# Patient Record
Sex: MALE | URBAN - METROPOLITAN AREA
[De-identification: names, ages, dates, MRNs, and addresses within clinical notes are randomized per-mention and may not be internally consistent; named-entity substitution may affect disease eponyms.]

---

## 2017-06-02 ENCOUNTER — DOCTOR'S OFFICE (OUTPATIENT)
Dept: URBAN - METROPOLITAN AREA CLINIC 136 | Facility: CLINIC | Age: 72
Setting detail: OPHTHALMOLOGY
End: 2017-06-02
Payer: MEDICARE

## 2017-06-02 ENCOUNTER — RX ONLY (RX ONLY)
Age: 72
End: 2017-06-02

## 2017-06-02 DIAGNOSIS — H25.12: ICD-10-CM

## 2017-06-02 DIAGNOSIS — E11.3551: ICD-10-CM

## 2017-06-02 DIAGNOSIS — Z96.1: ICD-10-CM

## 2017-06-02 DIAGNOSIS — H25.22: ICD-10-CM

## 2017-06-02 DIAGNOSIS — H35.379: ICD-10-CM

## 2017-06-02 PROCEDURE — 99205 OFFICE O/P NEW HI 60 MIN: CPT | Performed by: OPHTHALMOLOGY

## 2017-06-02 PROCEDURE — 92134 CPTRZ OPH DX IMG PST SGM RTA: CPT | Performed by: OPHTHALMOLOGY

## 2017-06-02 ASSESSMENT — CORNEAL PTERYGIUM: OS_PTERYGIUM: 1MM

## 2017-06-02 ASSESSMENT — REFRACTION_MANIFEST
OD_VA2: 20/
OU_VA: 20/
OD_VA1: 20/
OD_VA2: 20/
OS_VA3: 20/
OS_VA3: 20/
OS_VA2: 20/
OS_VA2: 20/
OS_VA1: 20/
OS_VA2: 20/
OS_VA1: 20/
OS_VA3: 20/
OD_VA3: 20/
OU_VA: 20/
OD_VA1: 20/
OD_VA3: 20/
OU_VA: 20/
OD_VA3: 20/
OD_VA1: 20/
OS_VA1: 20/
OD_VA2: 20/

## 2017-06-02 ASSESSMENT — SUPERFICIAL PUNCTATE KERATITIS (SPK)
OD_SPK: 2+
OS_SPK: 1+

## 2017-06-02 ASSESSMENT — REFRACTION_CURRENTRX
OS_OVR_VA: 20/
OD_OVR_VA: 20/
OS_OVR_VA: 20/
OS_OVR_VA: 20/
OD_OVR_VA: 20/
OD_OVR_VA: 20/

## 2017-06-02 ASSESSMENT — VISUAL ACUITY
OD_BCVA: 20/HM
OS_BCVA: 20/60

## 2017-07-12 ENCOUNTER — DOCTOR'S OFFICE (OUTPATIENT)
Dept: URBAN - METROPOLITAN AREA CLINIC 136 | Facility: CLINIC | Age: 72
Setting detail: OPHTHALMOLOGY
End: 2017-07-12
Payer: MEDICARE

## 2017-07-12 DIAGNOSIS — H35.379: ICD-10-CM

## 2017-07-12 DIAGNOSIS — M35.01: ICD-10-CM

## 2017-07-12 DIAGNOSIS — H25.22: ICD-10-CM

## 2017-07-12 DIAGNOSIS — E11.3551: ICD-10-CM

## 2017-07-12 DIAGNOSIS — H25.12: ICD-10-CM

## 2017-07-12 DIAGNOSIS — Z96.1: ICD-10-CM

## 2017-07-12 PROCEDURE — 92014 COMPRE OPH EXAM EST PT 1/>: CPT | Performed by: OPHTHALMOLOGY

## 2017-07-12 PROCEDURE — 76512 OPH US DX B-SCAN: CPT | Performed by: OPHTHALMOLOGY

## 2017-07-12 PROCEDURE — 92250 FUNDUS PHOTOGRAPHY W/I&R: CPT | Performed by: OPHTHALMOLOGY

## 2017-07-12 ASSESSMENT — REFRACTION_MANIFEST
OD_VA2: 20/
OD_VA2: 20/
OS_VA1: 20/
OS_VA3: 20/
OD_VA1: 20/
OS_VA1: 20/
OD_VA2: 20/
OS_VA1: 20/
OD_VA3: 20/
OS_VA3: 20/
OD_VA3: 20/
OU_VA: 20/
OS_VA2: 20/
OD_VA1: 20/
OU_VA: 20/
OD_VA3: 20/
OS_VA2: 20/
OS_VA3: 20/
OS_VA2: 20/
OD_VA1: 20/
OU_VA: 20/

## 2017-07-12 ASSESSMENT — CONFRONTATIONAL VISUAL FIELD TEST (CVF): OS_FINDINGS: FULL

## 2017-07-12 ASSESSMENT — REFRACTION_CURRENTRX
OD_OVR_VA: 20/
OS_OVR_VA: 20/
OD_OVR_VA: 20/
OD_OVR_VA: 20/
OS_OVR_VA: 20/
OS_OVR_VA: 20/

## 2017-07-12 ASSESSMENT — KERATOMETRY
OS_K1POWER_DIOPTERS: 44.25
OD_K1POWER_DIOPTERS: 44.75
OD_AXISANGLE_DEGREES: 27
OS_K2POWER_DIOPTERS: 46.50
OS_AXISANGLE_DEGREES: 172
OD_K2POWER_DIOPTERS: 45.50
METHOD_AUTO_MANUAL: AUTO

## 2017-07-12 ASSESSMENT — SPHEQUIV_DERIVED: OD_SPHEQUIV: 0

## 2017-07-12 ASSESSMENT — SUPERFICIAL PUNCTATE KERATITIS (SPK)
OS_SPK: 1+
OD_SPK: 2+

## 2017-07-12 ASSESSMENT — REFRACTION_AUTOREFRACTION
OD_SPHERE: +0.75
OD_AXIS: 110
OD_CYLINDER: -1.50

## 2017-07-12 ASSESSMENT — AXIALLENGTH_DERIVED: OD_AL: 23.0098

## 2017-07-12 ASSESSMENT — VISUAL ACUITY
OD_BCVA: 20/CF @FACE
OS_BCVA: 20/60-2

## 2017-07-12 ASSESSMENT — CORNEAL PTERYGIUM: OS_PTERYGIUM: 1MM

## 2017-07-26 ENCOUNTER — DOCTOR'S OFFICE (OUTPATIENT)
Dept: URBAN - METROPOLITAN AREA CLINIC 136 | Facility: CLINIC | Age: 72
Setting detail: OPHTHALMOLOGY
End: 2017-07-26
Payer: MEDICARE

## 2017-07-26 DIAGNOSIS — H25.012: ICD-10-CM

## 2017-07-26 PROCEDURE — 92136 OPHTHALMIC BIOMETRY: CPT | Performed by: OPHTHALMOLOGY

## 2017-08-04 ENCOUNTER — DOCTOR'S OFFICE (OUTPATIENT)
Dept: URBAN - METROPOLITAN AREA CLINIC 136 | Facility: CLINIC | Age: 72
Setting detail: OPHTHALMOLOGY
End: 2017-08-04
Payer: MEDICARE

## 2017-08-04 ENCOUNTER — RX ONLY (RX ONLY)
Age: 72
End: 2017-08-04

## 2017-08-04 DIAGNOSIS — Z96.1: ICD-10-CM

## 2017-08-04 DIAGNOSIS — E11.3551: ICD-10-CM

## 2017-08-04 DIAGNOSIS — H25.22: ICD-10-CM

## 2017-08-04 DIAGNOSIS — M35.01: ICD-10-CM

## 2017-08-04 DIAGNOSIS — H25.12: ICD-10-CM

## 2017-08-04 DIAGNOSIS — H35.379: ICD-10-CM

## 2017-08-04 DIAGNOSIS — H43.12: ICD-10-CM

## 2017-08-04 PROCEDURE — 92134 CPTRZ OPH DX IMG PST SGM RTA: CPT | Performed by: OPHTHALMOLOGY

## 2017-08-04 PROCEDURE — 92014 COMPRE OPH EXAM EST PT 1/>: CPT | Performed by: OPHTHALMOLOGY

## 2017-08-04 ASSESSMENT — REFRACTION_AUTOREFRACTION
OD_CYLINDER: -1.50
OD_AXIS: 110
OD_SPHERE: +0.75

## 2017-08-04 ASSESSMENT — REFRACTION_MANIFEST
OD_VA3: 20/
OD_VA3: 20/
OU_VA: 20/
OS_VA3: 20/
OS_VA2: 20/
OD_VA2: 20/
OU_VA: 20/
OS_VA2: 20/
OS_VA1: 20/
OD_VA2: 20/
OS_VA1: 20/
OS_VA1: 20/
OD_VA2: 20/
OD_VA3: 20/
OS_VA2: 20/
OD_VA1: 20/
OS_VA3: 20/
OS_VA3: 20/
OD_VA1: 20/
OD_VA1: 20/
OU_VA: 20/

## 2017-08-04 ASSESSMENT — VISUAL ACUITY
OS_BCVA: 20/60-2
OD_BCVA: 20/CF 1FT

## 2017-08-04 ASSESSMENT — REFRACTION_CURRENTRX
OS_OVR_VA: 20/
OD_OVR_VA: 20/
OD_OVR_VA: 20/
OS_OVR_VA: 20/
OD_OVR_VA: 20/
OS_OVR_VA: 20/

## 2017-08-04 ASSESSMENT — SPHEQUIV_DERIVED: OD_SPHEQUIV: 0

## 2017-08-04 ASSESSMENT — SUPERFICIAL PUNCTATE KERATITIS (SPK)
OS_SPK: 1+
OD_SPK: 2+

## 2017-08-04 ASSESSMENT — CONFRONTATIONAL VISUAL FIELD TEST (CVF): OS_FINDINGS: FULL

## 2017-08-04 ASSESSMENT — CORNEAL PTERYGIUM: OS_PTERYGIUM: 1MM

## 2017-08-09 ENCOUNTER — AMBUL SURGICAL CARE (OUTPATIENT)
Dept: URBAN - METROPOLITAN AREA SURGERY 32 | Facility: SURGERY | Age: 72
Setting detail: OPHTHALMOLOGY
End: 2017-08-09
Payer: MEDICARE

## 2017-08-09 DIAGNOSIS — H25.12: ICD-10-CM

## 2017-08-09 DIAGNOSIS — H25.042: ICD-10-CM

## 2017-08-09 PROCEDURE — 66984 XCAPSL CTRC RMVL W/O ECP: CPT | Performed by: OPHTHALMOLOGY

## 2017-08-10 ENCOUNTER — DOCTOR'S OFFICE (OUTPATIENT)
Dept: URBAN - METROPOLITAN AREA CLINIC 136 | Facility: CLINIC | Age: 72
Setting detail: OPHTHALMOLOGY
End: 2017-08-10
Payer: MEDICARE

## 2017-08-10 DIAGNOSIS — Z96.1: ICD-10-CM

## 2017-08-10 DIAGNOSIS — E11.3551: ICD-10-CM

## 2017-08-10 DIAGNOSIS — E11.3592: ICD-10-CM

## 2017-08-10 DIAGNOSIS — H43.12: ICD-10-CM

## 2017-08-10 PROBLEM — H25.12 CATARACT NUCLEAR SCLEROSIS; LEFT EYE: Status: RESOLVED | Noted: 2017-06-02 | Resolved: 2017-08-10

## 2017-08-10 PROCEDURE — 99024 POSTOP FOLLOW-UP VISIT: CPT | Performed by: OPHTHALMOLOGY

## 2017-08-10 ASSESSMENT — REFRACTION_MANIFEST
OD_VA2: 20/
OS_VA3: 20/
OS_VA1: 20/
OS_VA1: 20/
OD_VA3: 20/
OD_VA3: 20/
OD_VA1: 20/
OD_VA2: 20/
OS_VA2: 20/
OD_VA3: 20/
OS_VA2: 20/
OD_VA1: 20/
OU_VA: 20/
OU_VA: 20/
OS_VA2: 20/
OS_VA3: 20/
OD_VA1: 20/
OS_VA1: 20/
OS_VA3: 20/
OU_VA: 20/
OD_VA2: 20/

## 2017-08-10 ASSESSMENT — REFRACTION_CURRENTRX
OD_OVR_VA: 20/
OS_OVR_VA: 20/
OD_OVR_VA: 20/
OS_OVR_VA: 20/
OS_OVR_VA: 20/
OD_OVR_VA: 20/

## 2017-08-10 ASSESSMENT — VISUAL ACUITY
OS_BCVA: 20/60-2
OD_BCVA: 20/CF 1FT

## 2017-08-10 ASSESSMENT — CORNEAL PTERYGIUM: OS_PTERYGIUM: 1MM

## 2017-08-10 ASSESSMENT — CORNEAL EDEMA CLINICAL DESCRIPTION: OS_CORNEALEDEMA: TEMPORAL CORNEA

## 2017-08-10 ASSESSMENT — SUPERFICIAL PUNCTATE KERATITIS (SPK)
OS_SPK: 1+
OD_SPK: 2+

## 2017-08-10 ASSESSMENT — REFRACTION_AUTOREFRACTION
OD_SPHERE: +0.75
OD_AXIS: 110
OD_CYLINDER: -1.50

## 2017-08-10 ASSESSMENT — CORNEAL EDEMA - MICROCYSTIC EPITHELIAL EDEMA (MCE): OS_MCE: 1+

## 2017-08-10 ASSESSMENT — SPHEQUIV_DERIVED: OD_SPHEQUIV: 0

## 2017-08-14 ENCOUNTER — RX ONLY (RX ONLY)
Age: 72
End: 2017-08-14

## 2017-08-14 ENCOUNTER — DOCTOR'S OFFICE (OUTPATIENT)
Dept: URBAN - METROPOLITAN AREA CLINIC 136 | Facility: CLINIC | Age: 72
Setting detail: OPHTHALMOLOGY
End: 2017-08-14
Payer: MEDICARE

## 2017-08-14 DIAGNOSIS — E11.3592: ICD-10-CM

## 2017-08-14 DIAGNOSIS — H43.12: ICD-10-CM

## 2017-08-14 DIAGNOSIS — H40.42X2: ICD-10-CM

## 2017-08-14 DIAGNOSIS — E11.3551: ICD-10-CM

## 2017-08-14 PROCEDURE — 92012 INTRM OPH EXAM EST PATIENT: CPT | Performed by: OPHTHALMOLOGY

## 2017-08-14 ASSESSMENT — REFRACTION_MANIFEST
OS_VA2: 20/
OD_VA2: 20/
OD_VA3: 20/
OU_VA: 20/
OS_VA1: 20/
OD_VA3: 20/
OS_VA2: 20/
OS_VA3: 20/
OD_VA3: 20/
OS_VA1: 20/
OS_VA3: 20/
OU_VA: 20/
OD_VA2: 20/
OS_VA1: 20/
OS_VA2: 20/
OD_VA1: 20/
OD_VA1: 20/
OS_VA3: 20/
OU_VA: 20/
OD_VA2: 20/
OD_VA1: 20/

## 2017-08-14 ASSESSMENT — REFRACTION_CURRENTRX
OD_OVR_VA: 20/
OS_OVR_VA: 20/
OD_OVR_VA: 20/
OD_OVR_VA: 20/

## 2017-08-14 ASSESSMENT — VISUAL ACUITY
OS_BCVA: 20/70
OD_BCVA: 20/600

## 2017-08-14 ASSESSMENT — CORNEAL EDEMA CLINICAL DESCRIPTION: OS_CORNEALEDEMA: TEMPORAL CORNEA

## 2017-08-14 ASSESSMENT — SUPERFICIAL PUNCTATE KERATITIS (SPK)
OD_SPK: 2+
OS_SPK: 1+

## 2017-08-14 ASSESSMENT — CORNEAL EDEMA - MICROCYSTIC EPITHELIAL EDEMA (MCE): OS_MCE: 1+

## 2017-08-14 ASSESSMENT — REFRACTION_AUTOREFRACTION
OD_SPHERE: +0.75
OD_AXIS: 110
OD_CYLINDER: -1.50

## 2017-08-14 ASSESSMENT — CORNEAL PTERYGIUM: OS_PTERYGIUM: 1MM

## 2017-08-14 ASSESSMENT — SPHEQUIV_DERIVED: OD_SPHEQUIV: 0

## 2017-08-31 ENCOUNTER — RX ONLY (RX ONLY)
Age: 72
End: 2017-08-31

## 2017-08-31 ENCOUNTER — DOCTOR'S OFFICE (OUTPATIENT)
Dept: URBAN - METROPOLITAN AREA CLINIC 136 | Facility: CLINIC | Age: 72
Setting detail: OPHTHALMOLOGY
End: 2017-08-31
Payer: MEDICARE

## 2017-08-31 DIAGNOSIS — H40.42X2: ICD-10-CM

## 2017-08-31 DIAGNOSIS — H43.12: ICD-10-CM

## 2017-08-31 DIAGNOSIS — H33.022: ICD-10-CM

## 2017-08-31 DIAGNOSIS — E11.3521: ICD-10-CM

## 2017-08-31 DIAGNOSIS — Z96.1: ICD-10-CM

## 2017-08-31 DIAGNOSIS — E11.3592: ICD-10-CM

## 2017-08-31 PROCEDURE — 99024 POSTOP FOLLOW-UP VISIT: CPT | Performed by: OPHTHALMOLOGY

## 2017-08-31 PROCEDURE — 92134 CPTRZ OPH DX IMG PST SGM RTA: CPT | Performed by: OPHTHALMOLOGY

## 2017-08-31 ASSESSMENT — REFRACTION_CURRENTRX
OD_OVR_VA: 20/
OS_OVR_VA: 20/
OD_OVR_VA: 20/
OD_OVR_VA: 20/

## 2017-08-31 ASSESSMENT — SUPERFICIAL PUNCTATE KERATITIS (SPK)
OS_SPK: 1+
OD_SPK: 2+

## 2017-08-31 ASSESSMENT — REFRACTION_MANIFEST
OD_VA2: 20/
OS_VA2: 20/
OS_VA3: 20/
OD_VA2: 20/
OD_VA1: 20/
OD_VA3: 20/
OD_VA3: 20/
OS_VA1: 20/
OD_VA1: 20/
OS_VA3: 20/
OD_VA1: 20/
OU_VA: 20/
OS_VA2: 20/
OS_VA1: 20/
OD_VA2: 20/
OD_VA3: 20/
OU_VA: 20/
OS_VA2: 20/
OS_VA1: 20/
OU_VA: 20/
OS_VA3: 20/

## 2017-08-31 ASSESSMENT — KERATOMETRY
OS_K2POWER_DIOPTERS: 46.50
OS_AXISANGLE_DEGREES: 172
OS_K1POWER_DIOPTERS: 44.25
OD_K1POWER_DIOPTERS: 44.75
METHOD_AUTO_MANUAL: AUTO
OD_K2POWER_DIOPTERS: 45.50
OD_AXISANGLE_DEGREES: 27

## 2017-08-31 ASSESSMENT — AXIALLENGTH_DERIVED: OD_AL: 23.0098

## 2017-08-31 ASSESSMENT — CONFRONTATIONAL VISUAL FIELD TEST (CVF)
OD_FINDINGS: FULL
OS_FINDINGS: FULL

## 2017-08-31 ASSESSMENT — SPHEQUIV_DERIVED: OD_SPHEQUIV: 0

## 2017-08-31 ASSESSMENT — CORNEAL PTERYGIUM: OS_PTERYGIUM: 1MM

## 2017-08-31 ASSESSMENT — VISUAL ACUITY
OS_BCVA: 20/70-1
OD_BCVA: 20/300

## 2017-08-31 ASSESSMENT — REFRACTION_AUTOREFRACTION
OD_AXIS: 110
OD_SPHERE: +0.75
OD_CYLINDER: -1.50

## 2017-10-25 ENCOUNTER — DOCTOR'S OFFICE (OUTPATIENT)
Dept: URBAN - METROPOLITAN AREA CLINIC 136 | Facility: CLINIC | Age: 72
Setting detail: OPHTHALMOLOGY
End: 2017-10-25
Payer: MEDICARE

## 2017-10-25 DIAGNOSIS — H40.42X2: ICD-10-CM

## 2017-10-25 DIAGNOSIS — H43.12: ICD-10-CM

## 2017-10-25 DIAGNOSIS — M35.01: ICD-10-CM

## 2017-10-25 DIAGNOSIS — E11.3521: ICD-10-CM

## 2017-10-25 DIAGNOSIS — H33.022: ICD-10-CM

## 2017-10-25 DIAGNOSIS — E11.3592: ICD-10-CM

## 2017-10-25 DIAGNOSIS — H35.371: ICD-10-CM

## 2017-10-25 DIAGNOSIS — Z96.1: ICD-10-CM

## 2017-10-25 PROCEDURE — 99024 POSTOP FOLLOW-UP VISIT: CPT | Performed by: OPHTHALMOLOGY

## 2017-10-25 ASSESSMENT — SUPERFICIAL PUNCTATE KERATITIS (SPK)
OS_SPK: 1+
OD_SPK: 2+

## 2017-10-25 ASSESSMENT — REFRACTION_CURRENTRX
OD_OVR_VA: 20/
OS_OVR_VA: 20/
OD_OVR_VA: 20/
OD_OVR_VA: 20/

## 2017-10-25 ASSESSMENT — REFRACTION_MANIFEST
OD_VA2: 20/
OD_VA3: 20/
OS_VA1: 20/
OU_VA: 20/
OD_VA3: 20/
OS_VA2: 20/
OD_VA2: 20/
OD_VA2: 20/
OD_VA1: 20/
OS_VA3: 20/
OD_VA3: 20/
OS_VA1: 20/
OD_VA1: 20/
OS_VA3: 20/
OS_VA2: 20/
OS_VA1: 20/
OU_VA: 20/
OS_VA2: 20/
OS_VA3: 20/
OU_VA: 20/
OD_VA1: 20/

## 2017-10-25 ASSESSMENT — VISUAL ACUITY
OS_BCVA: 20/100
OD_BCVA: 20/HM

## 2017-10-25 ASSESSMENT — CONFRONTATIONAL VISUAL FIELD TEST (CVF)
OS_FINDINGS: FULL
OD_FINDINGS: FULL

## 2017-10-25 ASSESSMENT — SPHEQUIV_DERIVED: OD_SPHEQUIV: 0

## 2017-10-25 ASSESSMENT — REFRACTION_AUTOREFRACTION
OD_AXIS: 110
OD_CYLINDER: -1.50
OD_SPHERE: +0.75

## 2017-10-25 ASSESSMENT — CORNEAL PTERYGIUM: OS_PTERYGIUM: 1MM

## 2017-11-15 ENCOUNTER — DOCTOR'S OFFICE (OUTPATIENT)
Dept: URBAN - METROPOLITAN AREA CLINIC 136 | Facility: CLINIC | Age: 72
Setting detail: OPHTHALMOLOGY
End: 2017-11-15
Payer: COMMERCIAL

## 2017-11-15 ENCOUNTER — RX ONLY (RX ONLY)
Age: 72
End: 2017-11-15

## 2017-11-15 DIAGNOSIS — E11.3522: ICD-10-CM

## 2017-11-15 PROCEDURE — 67028 INJECTION EYE DRUG: CPT | Performed by: OPHTHALMOLOGY

## 2017-11-15 ASSESSMENT — REFRACTION_MANIFEST
OD_VA1: 20/
OD_VA3: 20/
OU_VA: 20/
OS_VA1: 20/
OU_VA: 20/
OS_VA2: 20/
OS_VA3: 20/
OD_VA2: 20/
OS_VA1: 20/
OD_VA3: 20/
OS_VA3: 20/
OD_VA3: 20/
OD_VA2: 20/
OS_VA2: 20/
OU_VA: 20/
OD_VA1: 20/
OD_VA2: 20/
OS_VA3: 20/
OD_VA1: 20/
OS_VA2: 20/
OS_VA1: 20/

## 2017-11-15 ASSESSMENT — REFRACTION_CURRENTRX
OS_OVR_VA: 20/
OS_OVR_VA: 20/
OD_OVR_VA: 20/
OS_OVR_VA: 20/
OD_OVR_VA: 20/
OD_OVR_VA: 20/

## 2017-11-15 ASSESSMENT — VISUAL ACUITY
OD_BCVA: CF
OS_BCVA: 20/100

## 2017-11-15 ASSESSMENT — SPHEQUIV_DERIVED: OD_SPHEQUIV: 0

## 2017-11-15 ASSESSMENT — REFRACTION_AUTOREFRACTION
OD_SPHERE: +0.75
OD_AXIS: 110
OD_CYLINDER: -1.50

## 2017-11-21 ENCOUNTER — AMBUL SURGICAL CARE (OUTPATIENT)
Dept: URBAN - METROPOLITAN AREA SURGERY 32 | Facility: SURGERY | Age: 72
Setting detail: OPHTHALMOLOGY
End: 2017-11-21
Payer: MEDICARE

## 2017-11-21 ENCOUNTER — AMBUL SURGICAL CARE (OUTPATIENT)
Dept: URBAN - METROPOLITAN AREA SURGERY 32 | Facility: SURGERY | Age: 72
Setting detail: OPHTHALMOLOGY
End: 2017-11-21
Payer: COMMERCIAL

## 2017-11-21 DIAGNOSIS — E11.3522: ICD-10-CM

## 2017-11-21 PROCEDURE — G8918 PT W/O PREOP ORDER IV AB PRO: HCPCS | Performed by: OPHTHALMOLOGY

## 2017-11-21 PROCEDURE — 67113 REPAIR RETINAL DETACH CPLX: CPT | Performed by: OPHTHALMOLOGY

## 2017-11-21 PROCEDURE — G8907 PT DOC NO EVENTS ON DISCHARG: HCPCS | Performed by: OPHTHALMOLOGY

## 2017-11-22 ENCOUNTER — DOCTOR'S OFFICE (OUTPATIENT)
Dept: URBAN - METROPOLITAN AREA CLINIC 136 | Facility: CLINIC | Age: 72
Setting detail: OPHTHALMOLOGY
End: 2017-11-22

## 2017-11-22 DIAGNOSIS — H11.32: ICD-10-CM

## 2017-11-22 DIAGNOSIS — H33.022: ICD-10-CM

## 2017-11-22 PROCEDURE — 99024 POSTOP FOLLOW-UP VISIT: CPT | Performed by: OPHTHALMOLOGY

## 2017-11-22 ASSESSMENT — SUPERFICIAL PUNCTATE KERATITIS (SPK)
OS_SPK: 1+
OD_SPK: 2+

## 2017-11-22 ASSESSMENT — CONFRONTATIONAL VISUAL FIELD TEST (CVF)
OD_FINDINGS: FULL
OS_FINDINGS: FULL

## 2017-11-22 ASSESSMENT — CORNEAL PTERYGIUM: OS_PTERYGIUM: 1MM

## 2017-11-27 ASSESSMENT — REFRACTION_MANIFEST
OD_VA3: 20/
OS_VA2: 20/
OU_VA: 20/
OS_VA1: 20/
OS_VA3: 20/
OS_VA1: 20/
OS_VA2: 20/
OS_VA3: 20/
OD_VA1: 20/
OD_VA3: 20/
OD_VA2: 20/
OS_VA2: 20/
OU_VA: 20/
OS_VA1: 20/
OD_VA2: 20/
OD_VA1: 20/
OS_VA3: 20/
OU_VA: 20/
OD_VA1: 20/
OD_VA2: 20/
OD_VA3: 20/

## 2017-11-27 ASSESSMENT — VISUAL ACUITY
OS_BCVA: 20/100
OD_BCVA: 20/300

## 2017-11-27 ASSESSMENT — REFRACTION_CURRENTRX
OS_OVR_VA: 20/
OD_OVR_VA: 20/
OS_OVR_VA: 20/
OD_OVR_VA: 20/
OS_OVR_VA: 20/
OD_OVR_VA: 20/

## 2017-11-27 ASSESSMENT — REFRACTION_AUTOREFRACTION
OD_AXIS: 110
OD_CYLINDER: -1.50
OD_SPHERE: +0.75

## 2017-11-27 ASSESSMENT — SPHEQUIV_DERIVED: OD_SPHEQUIV: 0

## 2017-11-28 ENCOUNTER — RX ONLY (RX ONLY)
Age: 72
End: 2017-11-28

## 2017-11-28 ENCOUNTER — DOCTOR'S OFFICE (OUTPATIENT)
Dept: URBAN - METROPOLITAN AREA CLINIC 136 | Facility: CLINIC | Age: 72
Setting detail: OPHTHALMOLOGY
End: 2017-11-28
Payer: MEDICARE

## 2017-11-28 DIAGNOSIS — H11.32: ICD-10-CM

## 2017-11-28 DIAGNOSIS — H33.022: ICD-10-CM

## 2017-11-28 PROBLEM — H35.371: Status: ACTIVE | Noted: 2017-10-25

## 2017-11-28 PROBLEM — H43.12: Status: ACTIVE | Noted: 2017-08-04

## 2017-11-28 PROBLEM — E11.3592: Status: ACTIVE | Noted: 2017-08-31

## 2017-11-28 PROBLEM — M35.01 KERATOCONJUNCTIVITIS SICCA: Status: ACTIVE | Noted: 2017-07-12

## 2017-11-28 PROBLEM — E11.3521: Status: ACTIVE | Noted: 2017-08-31

## 2017-11-28 PROBLEM — H40.42X2: Status: ACTIVE | Noted: 2017-08-14

## 2017-11-28 PROCEDURE — 99024 POSTOP FOLLOW-UP VISIT: CPT | Performed by: OPHTHALMOLOGY

## 2017-11-28 ASSESSMENT — REFRACTION_MANIFEST
OS_VA2: 20/
OS_VA2: 20/
OS_VA1: 20/
OD_VA2: 20/
OD_VA1: 20/
OD_VA3: 20/
OD_VA1: 20/
OU_VA: 20/
OD_VA2: 20/
OS_VA1: 20/
OS_VA3: 20/
OS_VA3: 20/
OD_VA2: 20/
OS_VA2: 20/
OD_VA3: 20/
OS_VA1: 20/
OU_VA: 20/
OD_VA3: 20/
OD_VA1: 20/
OU_VA: 20/
OS_VA3: 20/

## 2017-11-28 ASSESSMENT — CONFRONTATIONAL VISUAL FIELD TEST (CVF)
OD_FINDINGS: FULL
OS_FINDINGS: FULL

## 2017-11-28 ASSESSMENT — REFRACTION_CURRENTRX
OD_OVR_VA: 20/
OS_OVR_VA: 20/
OD_OVR_VA: 20/
OS_OVR_VA: 20/
OD_OVR_VA: 20/
OS_OVR_VA: 20/

## 2017-11-28 ASSESSMENT — SUPERFICIAL PUNCTATE KERATITIS (SPK)
OS_SPK: 1+
OD_SPK: 2+

## 2017-11-28 ASSESSMENT — KERATOMETRY
OS_K2POWER_DIOPTERS: 46.50
OS_AXISANGLE_DEGREES: 172
OD_AXISANGLE_DEGREES: 27
OD_K1POWER_DIOPTERS: 44.75
OS_K1POWER_DIOPTERS: 44.25
METHOD_AUTO_MANUAL: AUTO
OD_K2POWER_DIOPTERS: 45.50

## 2017-11-28 ASSESSMENT — CORNEAL PTERYGIUM: OS_PTERYGIUM: 1MM

## 2017-11-28 ASSESSMENT — REFRACTION_AUTOREFRACTION
OD_CYLINDER: -1.50
OS_SPHERE: +3.75
OS_CYLINDER: -2.50
OS_AXIS: 079
OD_SPHERE: +0.75
OD_AXIS: 110

## 2017-11-28 ASSESSMENT — SPHEQUIV_DERIVED
OD_SPHEQUIV: 0
OS_SPHEQUIV: 2.5

## 2017-11-28 ASSESSMENT — VISUAL ACUITY
OS_BCVA: 20/100+1
OD_BCVA: 20/300

## 2017-11-28 ASSESSMENT — AXIALLENGTH_DERIVED
OS_AL: 22.0387
OD_AL: 23.0098

## 2018-01-09 NOTE — PROGRESS NOTES
Plan  Essential thrombocytosis    · Hydroxyurea 500 MG Oral Capsule; TOME DOS CAPSULA POR VIA ORAL  DIARIAMENTETAKE 2 CAPSULES BY MOUTH DAILY   Rx By: Enrique Hayden; Dispense: 30 Days ; #:60 Capsule; Refill: 5; For: Essential thrombocytosis; EARL = N; Sent To: [facility]    Discussion/Summary  Discussion Summary:   In summary, this is a 20-year-old male history of essential thrombocytosis as outlined  His wife tells me that he was recently found to have a "blood test for lupus "  This was detected during workup for a intermittent rash on the arms and legs over the past 4-6 months  He has no joint stiffness, swelling or erythema  His appetite is good  He has dyspnea on exertion, but no shortness of breath at rest  He carries a diagnosis of pulmonary fibrosis  CMP is normal  CBC is normal  The exception of platelet count of 417 (his usual range fluctuates from 650-750 )  It's conceivable that some of his thrombocytosis is attributable to background inflammatory condition  I will check his records, but am nearly certain that we checked  An TY at the time of his initial diagnosis of essential thrombocytosis  As him to continue on Hydrea  He continues on Coumadin as well  I reviewed the above considerations  The patient and his wife  They voiced understanding and agreement  Understands and agrees with treatment plan: The treatment plan was reviewed with the patient/guardian  The patient/guardian understands and agrees with the treatment plan   Counseling Documentation With Imm: The patient was counseled regarding diagnostic results, instructions for management, patient and family education, impressions  total time of encounter was 25 minutes  Chief Complaint  Chief Complaint Free Text Note Form: Followup regarding essential thrombocytosis  History of Present Illness  HPI: July 2006- diagnosed with essentials thrombocytosis   Started on Hydrea 1 g by mouth daily, and aspirin 81 mg by mouth daily  May 2012 DVT right leg  Coumadin maintenance continues  ASA discontinued  Current Therapy: Hydrea 1 g by mouth daily  Coumadin target INR 2-3  Active Problems    1  Essential thrombocytosis (238 71) (D47 3)    Family History    1  Family history of epilepsy (V17 2) (Z82 0)    Social History    · Never a smoker    Current Meds   1  Actos 30 MG Oral Tablet; Therapy: 38SAI9467 to (Last AI:32GUL7492)  Requested for: 08AMU9603 Ordered   2  Atenolol 100 MG Oral Tablet; Therapy: 01UCG9256 to (Last QA:21CNQ8620)  Requested for: 86SWJ2472 Ordered   3  BD Pen Needle Mini U/F 31G X 5 MM Miscellaneous; Therapy: 42CJS0573 to (Last JL:69ION9576)  Requested for: 48GYT5209 Ordered   4  CloNIDine HCl - 0 1 MG Oral Tablet; Therapy: 84IBX2850 to (Last QL:06HFE4010)  Requested for: 02FJN9815 Ordered   5  FreeStyle Lite Test In [employer]; Therapy: 66VZN4933 to [phone] Recorded   6  Furosemide 80 MG Oral Tablet; Therapy: 43TOP2763 to (Last DD:33PMN8915)  Requested for: 92VIP7742 Ordered   7  HumuLIN 70/30 Pen (70-30) 100 UNIT/ML SUSP; Therapy: 74UGI3732 to (Last O37WWD5115)  Requested for: 14OPQ1882 Ordered   8  Hydrochlorothiazide 12 5 MG Oral Capsule; Therapy: 58Qds6060 to (73 [phone] 30) Recorded   9  Hydroxyurea 500 MG Oral Capsule; TOME DOS CAPSULA POR VIA ORAL   DIARIAMENTETAKE 2 CAPSULES BY MOUTH DAILY; Therapy: 14Zkx9701 to (Evaluate:2016)  Requested for: 60Apo0523; Last   Rx:52Hqf7142 Ordered   10  Januvia 50 MG Oral Tablet; Therapy: 05DQV1116 to (Last [facility])  Requested for: 82SII9366 Ordered   11  Kionex 15 GM/60ML Oral Suspension; Therapy: 25CCJ2231 to (Last Rx:80Dzb7951)  Requested for: 81Rzg6491 Ordered   12  Lisinopril 40 MG Oral Tablet; Therapy: 92YON0907 to (Last IL:24AQI1943)  Requested for: 57YXR5615 Ordered   13  Losartan Potassium 25 MG Oral Tablet; Therapy: 73Ona9281 to (Last Priti Gould)  Requested for: 39Oag8208 Ordered   14  MetFORMIN HCl - 1000 MG Oral Tablet; Therapy: 01UKX9727 to (Last XH:64CEK2956)  Requested for: 30PAM3549 Ordered   15  NovoLOG Mix 70/30 FlexPen (70-30) 100 UNIT/ML Subcutaneous Suspension    Pen-injector; Therapy: 77MBA7886 to (Evaluate:30Oct2014) Recorded   16  PrednisoLONE Acetate 1 % Ophthalmic Suspension; Therapy: 27MSM8478 to (Last Rx:19Nov2011)  Requested for: 08GGK1078 Ordered   17  Simvastatin 40 MG Oral Tablet; Therapy: 39OZU7320 to ([phone]) Recorded   18  Tekturna 150 MG Oral Tablet; Therapy: 52VTF6138 to (Last MU:89PCC1981)  Requested for: 35JOU8398 Ordered   19  Travatan Z 0 004 % Ophthalmic Solution; Therapy: 18VJI3297 to (Last Rx:30Jun2011)  Requested for: 71IMF2722 Ordered   20  Tricor 48 MG Oral Tablet; Therapy: 44YKK5603 to (Last WW:84WCB1132)  Requested for: 20JTM5858 Ordered   21  Warfarin Sodium 5 MG Oral Tablet; Therapy: 23IYE7089 to ([facility]) Recorded   22  Zetia 10 MG Oral Tablet; Therapy: 69LOG7790 to (Last Alainaelsie Pearl)  Requested for: 66WEY0489 Ordered    Allergies    1  No Known Drug Allergies    Vitals  Vital Signs [Data Includes: Current Encounter]    Recorded: 99GDK9723 02:44PM   Temperature 98 5 F   Heart Rate 84   Respiration 16   Systolic 593   Diastolic 84   Height 5 ft 2 in   Weight 182 lb 6 08 oz   BMI Calculated 33 36   BSA Calculated 1 84   O2 Saturation 98     Physical Exam    Constitutional   General appearance: No acute distress, well appearing and well nourished  Eyes   Conjunctiva and lids: No swelling, erythema, or discharge  Ears, Nose, Mouth, and Throat   External inspection of ears and nose: Normal     Oropharynx: Normal with no erythema, edema, exudate or lesions  Pulmonary   Auscultation of lungs: Clear to auscultation, equal breath sounds bilaterally, no wheezes, no rales, no rhonci  Cardiovascular   Auscultation of heart: Normal rate and rhythm, normal S1 and S2, without murmurs      Examination of extremities for edema and/or varicosities: Normal     Abdomen   Abdomen: Non-tender, no masses  Liver and spleen: No hepatomegaly or splenomegaly  Lymphatic   Palpation of lymph nodes in neck: No lymphadenopathy  Musculoskeletal   Gait and station: Normal     Skin   Skin and subcutaneous tissue: Normal without rashes or lesions  Neurologic   Cranial nerves: Cranial nerves 2-12 intact  Psychiatric   Orientation to person, place and time: Normal          Results/Data  Encounter Results   (1) CBC/ PLT (NO DIFF) 81XQV7664 11:23AM Arti Sin     Test Name Result Flag Reference   HEMATOCRIT 36 7 %  36 5-49 3   HEMOGLOBIN 12 6 g/dL  12 0-17 0   MCHC 34 3 g/dL  31 4-37 4   MCH 39 3 pg H 26 8-34 3    fL H 82-98   PLATELET COUNT 340 Thousands/uL H 149-390   RBC COUNT 3 21 Million/uL L 3 88-5 62   RDW 13 3 %  11 6-15 1   WBC COUNT 7 36 Thousand/uL  4 31-10 16   MPV 8 9 fL  8 9-12 7     (1) COMPREHENSIVE METABOLIC PANEL 39CGJ4272 74:12GC [address] Kidney Disease Education Program recommendations are as follows:  GFR calculation is accurate only with a steady state creatinine  Chronic Kidney disease less than 60 ml/min/1 73 sq  meters  Kidney failure less than 15 ml/min/1 73 sq  meters  Test Name Result Flag Reference   GLUCOSE,RANDM 86 mg/dL     If the patient is fasting, the ADA then defines impaired fasting glucose as > 100 mg/dL and diabetes as > or equal to 123 mg/dL     SODIUM 139 mmol/L  136-145   POTASSIUM 4 9 mmol/L  3 5-5 3   CHLORIDE 107 mmol/L  100-108   CARBON DIOXIDE 30 mmol/L  21-32   ANION GAP (CALC) 2 mmol/L L 4-13   BLOOD UREA NITROGEN 18 mg/dL  5-25   CREATININE 1 10 mg/dL  0 60-1 30   Standardized to IDMS reference method   CALCIUM 8 5 mg/dL  8 3-10 1   BILI, TOTAL 0 24 mg/dL  0 20-1 00   ALK PHOSPHATAS 82 U/L     ALT (SGPT) 28 U/L  12-78   AST(SGOT) 31 U/L  5-45   ALBUMIN 3 7 g/dL  3 5-5 0   TOTAL PROTEIN 7 7 g/dL  6 4-8 2   eGFR Non- >60 0 ml/min/1 73sq m       Signatures   Electronically signed by : TONY Araujo ,DO; Mar 17 2016  3:09PM EST                       (Author)

## 2020-01-01 ENCOUNTER — APPOINTMENT (INPATIENT)
Dept: RADIOLOGY | Facility: HOSPITAL | Age: 75
DRG: 208 | End: 2020-01-01
Payer: MEDICARE

## 2020-01-01 ENCOUNTER — APPOINTMENT (EMERGENCY)
Dept: RADIOLOGY | Facility: HOSPITAL | Age: 75
DRG: 208 | End: 2020-01-01
Payer: MEDICARE

## 2020-01-01 ENCOUNTER — HOSPITAL ENCOUNTER (INPATIENT)
Facility: HOSPITAL | Age: 75
LOS: 4 days | DRG: 208 | End: 2020-02-02
Attending: EMERGENCY MEDICINE | Admitting: INTERNAL MEDICINE
Payer: MEDICARE

## 2020-01-01 ENCOUNTER — APPOINTMENT (EMERGENCY)
Dept: CT IMAGING | Facility: HOSPITAL | Age: 75
DRG: 208 | End: 2020-01-01
Payer: MEDICARE

## 2020-01-01 ENCOUNTER — APPOINTMENT (INPATIENT)
Dept: NON INVASIVE DIAGNOSTICS | Facility: HOSPITAL | Age: 75
DRG: 208 | End: 2020-01-01
Payer: MEDICARE

## 2020-01-01 ENCOUNTER — APPOINTMENT (INPATIENT)
Dept: GASTROENTEROLOGY | Facility: HOSPITAL | Age: 75
DRG: 208 | End: 2020-01-01
Payer: MEDICARE

## 2020-01-01 DIAGNOSIS — J96.90 RESPIRATORY FAILURE (HCC): Primary | ICD-10-CM

## 2020-01-01 DIAGNOSIS — J96.21 ACUTE AND CHRONIC RESPIRATORY FAILURE WITH HYPOXIA (HCC): ICD-10-CM

## 2020-01-01 DIAGNOSIS — R65.20 SEVERE SEPSIS (HCC): ICD-10-CM

## 2020-01-01 DIAGNOSIS — J84.9 INTERSTITIAL LUNG DISEASE (HCC): ICD-10-CM

## 2020-01-01 DIAGNOSIS — A41.9 SEVERE SEPSIS (HCC): ICD-10-CM

## 2020-01-01 DIAGNOSIS — J18.9 COMMUNITY ACQUIRED PNEUMONIA: ICD-10-CM

## 2020-01-01 LAB
ABO GROUP BLD BPU: NORMAL
ABO GROUP BLD: NORMAL
ABO GROUP BLD: NORMAL
ALBUMIN SERPL BCP-MCNC: 3.1 G/DL (ref 3.5–5)
ALP SERPL-CCNC: 74 U/L (ref 46–116)
ALT SERPL W P-5'-P-CCNC: 18 U/L (ref 12–78)
AMMONIA PLAS-SCNC: 18 UMOL/L (ref 11–35)
ANION GAP SERPL CALCULATED.3IONS-SCNC: 11 MMOL/L (ref 4–13)
ANION GAP SERPL CALCULATED.3IONS-SCNC: 14 MMOL/L (ref 4–13)
ANION GAP SERPL CALCULATED.3IONS-SCNC: 6 MMOL/L (ref 4–13)
ANION GAP SERPL CALCULATED.3IONS-SCNC: 9 MMOL/L (ref 4–13)
ANION GAP SERPL CALCULATED.3IONS-SCNC: 9 MMOL/L (ref 4–13)
ANISOCYTOSIS BLD QL SMEAR: PRESENT
ANISOCYTOSIS BLD QL SMEAR: PRESENT
APTT PPP: 60 SECONDS (ref 23–37)
ARTERIAL PATENCY WRIST A: YES
AST SERPL W P-5'-P-CCNC: 24 U/L (ref 5–45)
ATRIAL RATE: 134 BPM
BACTERIA BRONCH AEROBE CULT: NO GROWTH
BACTERIA SPT RESP CULT: NORMAL
BACTERIA UR QL AUTO: ABNORMAL /HPF
BASE EXCESS BLDA CALC-SCNC: -3.3 MMOL/L
BASE EXCESS BLDA CALC-SCNC: -4.3 MMOL/L
BASE EXCESS BLDA CALC-SCNC: -4.5 MMOL/L
BASE EXCESS BLDA CALC-SCNC: -5.7 MMOL/L
BASE EXCESS BLDA CALC-SCNC: -5.9 MMOL/L
BASE EXCESS BLDA CALC-SCNC: -9.2 MMOL/L
BASOPHILS # BLD AUTO: 0.06 THOUSANDS/ΜL (ref 0–0.1)
BASOPHILS # BLD MANUAL: 0 THOUSAND/UL (ref 0–0.1)
BASOPHILS # BLD MANUAL: 0.1 THOUSAND/UL (ref 0–0.1)
BASOPHILS NFR BLD AUTO: 1 % (ref 0–1)
BASOPHILS NFR MAR MANUAL: 0 % (ref 0–1)
BASOPHILS NFR MAR MANUAL: 1 % (ref 0–1)
BILIRUB SERPL-MCNC: 0.29 MG/DL (ref 0.2–1)
BILIRUB UR QL STRIP: NEGATIVE
BLD GP AB SCN SERPL QL: NEGATIVE
BPU ID: NORMAL
BUN SERPL-MCNC: 16 MG/DL (ref 5–25)
BUN SERPL-MCNC: 17 MG/DL (ref 5–25)
BUN SERPL-MCNC: 18 MG/DL (ref 5–25)
BUN SERPL-MCNC: 22 MG/DL (ref 5–25)
BUN SERPL-MCNC: 25 MG/DL (ref 5–25)
BURR CELLS BLD QL SMEAR: PRESENT
CA-I BLD-SCNC: 1.07 MMOL/L (ref 1.12–1.32)
CALCIUM SERPL-MCNC: 7.1 MG/DL (ref 8.3–10.1)
CALCIUM SERPL-MCNC: 7.6 MG/DL (ref 8.3–10.1)
CALCIUM SERPL-MCNC: 7.7 MG/DL (ref 8.3–10.1)
CALCIUM SERPL-MCNC: 7.9 MG/DL (ref 8.3–10.1)
CALCIUM SERPL-MCNC: 8.1 MG/DL (ref 8.3–10.1)
CHLORIDE SERPL-SCNC: 104 MMOL/L (ref 100–108)
CHLORIDE SERPL-SCNC: 104 MMOL/L (ref 100–108)
CHLORIDE SERPL-SCNC: 105 MMOL/L (ref 100–108)
CHLORIDE SERPL-SCNC: 109 MMOL/L (ref 100–108)
CHLORIDE SERPL-SCNC: 109 MMOL/L (ref 100–108)
CLARITY UR: CLEAR
CO2 SERPL-SCNC: 21 MMOL/L (ref 21–32)
CO2 SERPL-SCNC: 23 MMOL/L (ref 21–32)
CO2 SERPL-SCNC: 24 MMOL/L (ref 21–32)
CO2 SERPL-SCNC: 25 MMOL/L (ref 21–32)
CO2 SERPL-SCNC: 26 MMOL/L (ref 21–32)
COLOR UR: YELLOW
CREAT SERPL-MCNC: 1.16 MG/DL (ref 0.6–1.3)
CREAT SERPL-MCNC: 1.18 MG/DL (ref 0.6–1.3)
CREAT SERPL-MCNC: 1.23 MG/DL (ref 0.6–1.3)
CREAT SERPL-MCNC: 1.32 MG/DL (ref 0.6–1.3)
CREAT SERPL-MCNC: 1.47 MG/DL (ref 0.6–1.3)
CROSSMATCH: NORMAL
DOHLE BOD BLD QL SMEAR: PRESENT
EOSINOPHIL # BLD AUTO: 0.26 THOUSAND/ΜL (ref 0–0.61)
EOSINOPHIL # BLD MANUAL: 0 THOUSAND/UL (ref 0–0.4)
EOSINOPHIL # BLD MANUAL: 0.77 THOUSAND/UL (ref 0–0.4)
EOSINOPHIL NFR BLD AUTO: 3 % (ref 0–6)
EOSINOPHIL NFR BLD MANUAL: 0 % (ref 0–6)
EOSINOPHIL NFR BLD MANUAL: 8 % (ref 0–6)
ERYTHROCYTE [DISTWIDTH] IN BLOOD BY AUTOMATED COUNT: 16.9 % (ref 11.6–15.1)
ERYTHROCYTE [DISTWIDTH] IN BLOOD BY AUTOMATED COUNT: 17.9 % (ref 11.6–15.1)
FLUAV RNA NPH QL NAA+PROBE: NORMAL
FLUBV RNA NPH QL NAA+PROBE: NORMAL
GFR SERPL CREATININE-BSD FRML MDRD: 46 ML/MIN/1.73SQ M
GFR SERPL CREATININE-BSD FRML MDRD: 53 ML/MIN/1.73SQ M
GFR SERPL CREATININE-BSD FRML MDRD: 57 ML/MIN/1.73SQ M
GFR SERPL CREATININE-BSD FRML MDRD: 60 ML/MIN/1.73SQ M
GFR SERPL CREATININE-BSD FRML MDRD: 62 ML/MIN/1.73SQ M
GLUCOSE SERPL-MCNC: 109 MG/DL (ref 65–140)
GLUCOSE SERPL-MCNC: 115 MG/DL (ref 65–140)
GLUCOSE SERPL-MCNC: 124 MG/DL (ref 65–140)
GLUCOSE SERPL-MCNC: 129 MG/DL (ref 65–140)
GLUCOSE SERPL-MCNC: 159 MG/DL (ref 65–140)
GLUCOSE SERPL-MCNC: 160 MG/DL (ref 65–140)
GLUCOSE SERPL-MCNC: 175 MG/DL (ref 65–140)
GLUCOSE SERPL-MCNC: 191 MG/DL (ref 65–140)
GLUCOSE SERPL-MCNC: 224 MG/DL (ref 65–140)
GLUCOSE SERPL-MCNC: 288 MG/DL (ref 65–140)
GLUCOSE SERPL-MCNC: 290 MG/DL (ref 65–140)
GLUCOSE SERPL-MCNC: 294 MG/DL (ref 65–140)
GLUCOSE SERPL-MCNC: 300 MG/DL (ref 65–140)
GLUCOSE SERPL-MCNC: 333 MG/DL (ref 65–140)
GLUCOSE SERPL-MCNC: 349 MG/DL (ref 65–140)
GLUCOSE SERPL-MCNC: 362 MG/DL (ref 65–140)
GLUCOSE SERPL-MCNC: 364 MG/DL (ref 65–140)
GLUCOSE SERPL-MCNC: 374 MG/DL (ref 65–140)
GLUCOSE SERPL-MCNC: 375 MG/DL (ref 65–140)
GLUCOSE SERPL-MCNC: 416 MG/DL (ref 65–140)
GLUCOSE SERPL-MCNC: 466 MG/DL (ref 65–140)
GLUCOSE SERPL-MCNC: 498 MG/DL (ref 65–140)
GLUCOSE SERPL-MCNC: 84 MG/DL (ref 65–140)
GLUCOSE SERPL-MCNC: 97 MG/DL (ref 65–140)
GLUCOSE SERPL-MCNC: >500 MG/DL (ref 65–140)
GLUCOSE UR STRIP-MCNC: NEGATIVE MG/DL
GRAM STN SPEC: NORMAL
HCO3 BLDA-SCNC: 15.3 MMOL/L (ref 22–28)
HCO3 BLDA-SCNC: 17.8 MMOL/L (ref 22–28)
HCO3 BLDA-SCNC: 18.6 MMOL/L (ref 22–28)
HCO3 BLDA-SCNC: 19 MMOL/L (ref 22–28)
HCO3 BLDA-SCNC: 20.3 MMOL/L (ref 22–28)
HCO3 BLDA-SCNC: 21.1 MMOL/L (ref 22–28)
HCT VFR BLD AUTO: 21.3 % (ref 36.5–49.3)
HCT VFR BLD AUTO: 22.6 % (ref 36.5–49.3)
HCT VFR BLD AUTO: 23.5 % (ref 36.5–49.3)
HCT VFR BLD AUTO: 25 % (ref 36.5–49.3)
HCT VFR BLD AUTO: 26.3 % (ref 36.5–49.3)
HCT VFR BLD AUTO: 27.3 % (ref 36.5–49.3)
HGB BLD-MCNC: 6.8 G/DL (ref 12–17)
HGB BLD-MCNC: 6.8 G/DL (ref 12–17)
HGB BLD-MCNC: 7.2 G/DL (ref 12–17)
HGB BLD-MCNC: 7.2 G/DL (ref 12–17)
HGB BLD-MCNC: 7.4 G/DL (ref 12–17)
HGB BLD-MCNC: 7.4 G/DL (ref 12–17)
HGB BLD-MCNC: 8.1 G/DL (ref 12–17)
HGB BLD-MCNC: 8.6 G/DL (ref 12–17)
HGB BLD-MCNC: 8.8 G/DL (ref 12–17)
HGB UR QL STRIP.AUTO: ABNORMAL
HOROWITZ INDEX BLDA+IHG-RTO: 100 MM[HG]
HOROWITZ INDEX BLDA+IHG-RTO: 100 MM[HG]
HOROWITZ INDEX BLDA+IHG-RTO: 50 MM[HG]
HOROWITZ INDEX BLDA+IHG-RTO: 50 MM[HG]
HOROWITZ INDEX BLDA+IHG-RTO: 60 MM[HG]
I-TIME: 1
IMM GRANULOCYTES # BLD AUTO: 0.12 THOUSAND/UL (ref 0–0.2)
IMM GRANULOCYTES NFR BLD AUTO: 1 % (ref 0–2)
INR PPP: 1.46 (ref 0.84–1.19)
INR PPP: 2.13 (ref 0.84–1.19)
INR PPP: 2.89 (ref 0.84–1.19)
INR PPP: 2.96 (ref 0.84–1.19)
IPAP: 16
KETONES UR STRIP-MCNC: NEGATIVE MG/DL
L PNEUMO1 AG UR QL IA.RAPID: NEGATIVE
LACTATE SERPL-SCNC: 2.2 MMOL/L (ref 0.5–2)
LACTATE SERPL-SCNC: 7.3 MMOL/L (ref 0.5–2)
LEUKOCYTE ESTERASE UR QL STRIP: NEGATIVE
LYMPHOCYTES # BLD AUTO: 0.25 THOUSAND/UL (ref 0.6–4.47)
LYMPHOCYTES # BLD AUTO: 0.98 THOUSANDS/ΜL (ref 0.6–4.47)
LYMPHOCYTES # BLD AUTO: 1.53 THOUSAND/UL (ref 0.6–4.47)
LYMPHOCYTES # BLD AUTO: 16 % (ref 14–44)
LYMPHOCYTES # BLD AUTO: 16 % (ref 14–44)
LYMPHOCYTES NFR BLD AUTO: 10 % (ref 14–44)
MACROCYTES BLD QL AUTO: PRESENT
MAGNESIUM SERPL-MCNC: 1.5 MG/DL (ref 1.6–2.6)
MAGNESIUM SERPL-MCNC: 2.2 MG/DL (ref 1.6–2.6)
MCH RBC QN AUTO: 39.7 PG (ref 26.8–34.3)
MCH RBC QN AUTO: 39.8 PG (ref 26.8–34.3)
MCH RBC QN AUTO: 39.8 PG (ref 26.8–34.3)
MCH RBC QN AUTO: 40.2 PG (ref 26.8–34.3)
MCH RBC QN AUTO: 42.8 PG (ref 26.8–34.3)
MCH RBC QN AUTO: 43.3 PG (ref 26.8–34.3)
MCHC RBC AUTO-ENTMCNC: 31.5 G/DL (ref 31.4–37.4)
MCHC RBC AUTO-ENTMCNC: 31.9 G/DL (ref 31.4–37.4)
MCHC RBC AUTO-ENTMCNC: 31.9 G/DL (ref 31.4–37.4)
MCHC RBC AUTO-ENTMCNC: 32.2 G/DL (ref 31.4–37.4)
MCHC RBC AUTO-ENTMCNC: 32.4 G/DL (ref 31.4–37.4)
MCHC RBC AUTO-ENTMCNC: 32.7 G/DL (ref 31.4–37.4)
MCV RBC AUTO: 122 FL (ref 82–98)
MCV RBC AUTO: 123 FL (ref 82–98)
MCV RBC AUTO: 123 FL (ref 82–98)
MCV RBC AUTO: 126 FL (ref 82–98)
MCV RBC AUTO: 134 FL (ref 82–98)
MCV RBC AUTO: 135 FL (ref 82–98)
MONOCYTES # BLD AUTO: 0.4 THOUSAND/UL (ref 0–1.22)
MONOCYTES # BLD AUTO: 0.6 THOUSAND/ΜL (ref 0.17–1.22)
MONOCYTES # BLD AUTO: 0.86 THOUSAND/UL (ref 0–1.22)
MONOCYTES NFR BLD AUTO: 6 % (ref 4–12)
MONOCYTES NFR BLD: 26 % (ref 4–12)
MONOCYTES NFR BLD: 9 % (ref 4–12)
NEUTROPHILS # BLD AUTO: 7.67 THOUSANDS/ΜL (ref 1.85–7.62)
NEUTROPHILS # BLD MANUAL: 0.87 THOUSAND/UL (ref 1.85–7.62)
NEUTROPHILS # BLD MANUAL: 6.13 THOUSAND/UL (ref 1.85–7.62)
NEUTS BAND NFR BLD MANUAL: 12 % (ref 0–8)
NEUTS BAND NFR BLD MANUAL: 6 % (ref 0–8)
NEUTS SEG NFR BLD AUTO: 44 % (ref 43–75)
NEUTS SEG NFR BLD AUTO: 58 % (ref 43–75)
NEUTS SEG NFR BLD AUTO: 79 % (ref 43–75)
NITRITE UR QL STRIP: NEGATIVE
NON VENT- BIPAP: ABNORMAL
NON-SQ EPI CELLS URNS QL MICRO: ABNORMAL /HPF
NRBC BLD AUTO-RTO: 0 /100 WBCS
NRBC BLD AUTO-RTO: 0 /100 WBCS
NRBC BLD AUTO-RTO: 2 /100 WBCS
NT-PROBNP SERPL-MCNC: 299 PG/ML
O2 CT BLDA-SCNC: 11.5 ML/DL (ref 16–23)
O2 CT BLDA-SCNC: 14.7 ML/DL (ref 16–23)
O2 CT BLDA-SCNC: 18.5 ML/DL (ref 16–23)
O2 CT BLDA-SCNC: 8.7 ML/DL (ref 16–23)
O2 CT BLDA-SCNC: 9.2 ML/DL (ref 16–23)
O2 CT BLDA-SCNC: 9.5 ML/DL (ref 16–23)
OXYHGB MFR BLDA: 75.6 % (ref 94–97)
OXYHGB MFR BLDA: 84.8 % (ref 94–97)
OXYHGB MFR BLDA: 86 % (ref 94–97)
OXYHGB MFR BLDA: 94.5 % (ref 94–97)
OXYHGB MFR BLDA: 97.1 % (ref 94–97)
OXYHGB MFR BLDA: 98.1 % (ref 94–97)
P AXIS: 55 DEGREES
P JIROVECII AG SPEC QL IF: NORMAL
PCO2 BLDA: 26 MM HG (ref 36–44)
PCO2 BLDA: 28.4 MM HG (ref 36–44)
PCO2 BLDA: 29.3 MM HG (ref 36–44)
PCO2 BLDA: 33 MM HG (ref 36–44)
PCO2 BLDA: 35 MM HG (ref 36–44)
PCO2 BLDA: 36.5 MM HG (ref 36–44)
PEEP MAX SETTING VENT: 8 CM[H2O]
PEEP RESPIRATORY: 10 CM[H2O]
PEEP RESPIRATORY: 5 CM[H2O]
PH BLDA: 7.34 [PH] (ref 7.35–7.45)
PH BLDA: 7.36 [PH] (ref 7.35–7.45)
PH BLDA: 7.38 [PH] (ref 7.35–7.45)
PH BLDA: 7.4 [PH] (ref 7.35–7.45)
PH BLDA: 7.42 [PH] (ref 7.35–7.45)
PH BLDA: 7.47 [PH] (ref 7.35–7.45)
PH UR STRIP.AUTO: 6 [PH] (ref 4.5–8)
PHOSPHATE SERPL-MCNC: 2.3 MG/DL (ref 2.3–4.1)
PLATELET # BLD AUTO: 214 THOUSANDS/UL (ref 149–390)
PLATELET # BLD AUTO: 282 THOUSANDS/UL (ref 149–390)
PLATELET # BLD AUTO: 319 THOUSANDS/UL (ref 149–390)
PLATELET # BLD AUTO: 329 THOUSANDS/UL (ref 149–390)
PLATELET # BLD AUTO: 344 THOUSANDS/UL (ref 149–390)
PLATELET # BLD AUTO: 496 THOUSANDS/UL (ref 149–390)
PLATELET BLD QL SMEAR: ADEQUATE
PLATELET BLD QL SMEAR: ADEQUATE
PMV BLD AUTO: 10 FL (ref 8.9–12.7)
PMV BLD AUTO: 10 FL (ref 8.9–12.7)
PMV BLD AUTO: 10.1 FL (ref 8.9–12.7)
PMV BLD AUTO: 10.5 FL (ref 8.9–12.7)
PMV BLD AUTO: 10.8 FL (ref 8.9–12.7)
PMV BLD AUTO: 9.3 FL (ref 8.9–12.7)
PO2 BLDA: 118.6 MM HG (ref 75–129)
PO2 BLDA: 216.1 MM HG (ref 75–129)
PO2 BLDA: 45 MM HG (ref 75–129)
PO2 BLDA: 53.8 MM HG (ref 75–129)
PO2 BLDA: 55.5 MM HG (ref 75–129)
PO2 BLDA: 90.3 MM HG (ref 75–129)
POLYCHROMASIA BLD QL SMEAR: PRESENT
POLYCHROMASIA BLD QL SMEAR: PRESENT
POTASSIUM SERPL-SCNC: 3.3 MMOL/L (ref 3.5–5.3)
POTASSIUM SERPL-SCNC: 3.8 MMOL/L (ref 3.5–5.3)
POTASSIUM SERPL-SCNC: 3.8 MMOL/L (ref 3.5–5.3)
POTASSIUM SERPL-SCNC: 3.9 MMOL/L (ref 3.5–5.3)
POTASSIUM SERPL-SCNC: 3.9 MMOL/L (ref 3.5–5.3)
PR INTERVAL: 132 MS
PRESSURE CONTROL: 5
PRESSURE CONTROL: 5
PROCALCITONIN SERPL-MCNC: 1.09 NG/ML
PROCALCITONIN SERPL-MCNC: 3.62 NG/ML
PROMYELOCYTES NFR BLD MANUAL: 1 % (ref 0–0)
PROT SERPL-MCNC: 7 G/DL (ref 6.4–8.2)
PROT UR STRIP-MCNC: ABNORMAL MG/DL
PROTHROMBIN TIME: 18 SECONDS (ref 11.6–14.5)
PROTHROMBIN TIME: 24.2 SECONDS (ref 11.6–14.5)
PROTHROMBIN TIME: 30.9 SECONDS (ref 11.6–14.5)
PROTHROMBIN TIME: 31.5 SECONDS (ref 11.6–14.5)
QRS AXIS: 37 DEGREES
QRSD INTERVAL: 84 MS
QT INTERVAL: 286 MS
QTC INTERVAL: 427 MS
RBC # BLD AUTO: 1.59 MILLION/UL (ref 3.88–5.62)
RBC # BLD AUTO: 1.81 MILLION/UL (ref 3.88–5.62)
RBC # BLD AUTO: 1.86 MILLION/UL (ref 3.88–5.62)
RBC # BLD AUTO: 2.03 MILLION/UL (ref 3.88–5.62)
RBC # BLD AUTO: 2.04 MILLION/UL (ref 3.88–5.62)
RBC # BLD AUTO: 2.14 MILLION/UL (ref 3.88–5.62)
RBC #/AREA URNS AUTO: ABNORMAL /HPF
RBC MORPH BLD: PRESENT
RH BLD: POSITIVE
RH BLD: POSITIVE
RSV RNA NPH QL NAA+PROBE: NORMAL
S PNEUM AG UR QL: NEGATIVE
SCHISTOCYTES BLD QL SMEAR: PRESENT
SCHISTOCYTES BLD QL SMEAR: PRESENT
SODIUM SERPL-SCNC: 138 MMOL/L (ref 136–145)
SODIUM SERPL-SCNC: 139 MMOL/L (ref 136–145)
SODIUM SERPL-SCNC: 140 MMOL/L (ref 136–145)
SODIUM SERPL-SCNC: 141 MMOL/L (ref 136–145)
SODIUM SERPL-SCNC: 141 MMOL/L (ref 136–145)
SP GR UR STRIP.AUTO: 1.02 (ref 1–1.03)
SPECIMEN EXPIRATION DATE: NORMAL
SPECIMEN SOURCE: ABNORMAL
T WAVE AXIS: 12 DEGREES
TARGETS BLD QL SMEAR: PRESENT
TOTAL CELLS COUNTED SPEC: 100
TOTAL CELLS COUNTED SPEC: 100
TROPONIN I SERPL-MCNC: 1.28 NG/ML
TROPONIN I SERPL-MCNC: 2.34 NG/ML
TROPONIN I SERPL-MCNC: 2.5 NG/ML
TROPONIN I SERPL-MCNC: 3.36 NG/ML
UNIT DISPENSE STATUS: NORMAL
UNIT PRODUCT CODE: NORMAL
UNIT RH: NORMAL
UROBILINOGEN UR QL STRIP.AUTO: 0.2 E.U./DL
VANCOMYCIN TROUGH SERPL-MCNC: 15 UG/ML (ref 10–20)
VARIANT LYMPHS # BLD AUTO: 1 %
VARIANT LYMPHS # BLD AUTO: 2 %
VENT AC: 14
VENT AC: 16
VENT AC: 16
VENT BIPAP FIO2: 100 %
VENT- AC: AC
VENTRICULAR RATE: 134 BPM
VT SETTING VENT: 380 ML
VT SETTING VENT: 400 ML
VT SETTING VENT: 450 ML
WBC # BLD AUTO: 1.07 THOUSAND/UL (ref 4.31–10.16)
WBC # BLD AUTO: 1.55 THOUSAND/UL (ref 4.31–10.16)
WBC # BLD AUTO: 1.79 THOUSAND/UL (ref 4.31–10.16)
WBC # BLD AUTO: 9.5 THOUSAND/UL (ref 4.31–10.16)
WBC # BLD AUTO: 9.58 THOUSAND/UL (ref 4.31–10.16)
WBC # BLD AUTO: 9.69 THOUSAND/UL (ref 4.31–10.16)
WBC #/AREA URNS AUTO: ABNORMAL /HPF

## 2020-01-01 PROCEDURE — 36600 WITHDRAWAL OF ARTERIAL BLOOD: CPT

## 2020-01-01 PROCEDURE — 85025 COMPLETE CBC W/AUTO DIFF WBC: CPT | Performed by: EMERGENCY MEDICINE

## 2020-01-01 PROCEDURE — 85027 COMPLETE CBC AUTOMATED: CPT | Performed by: NURSE PRACTITIONER

## 2020-01-01 PROCEDURE — 85018 HEMOGLOBIN: CPT | Performed by: NURSE PRACTITIONER

## 2020-01-01 PROCEDURE — 80048 BASIC METABOLIC PNL TOTAL CA: CPT | Performed by: NURSE PRACTITIONER

## 2020-01-01 PROCEDURE — 96375 TX/PRO/DX INJ NEW DRUG ADDON: CPT

## 2020-01-01 PROCEDURE — 81001 URINALYSIS AUTO W/SCOPE: CPT

## 2020-01-01 PROCEDURE — 36415 COLL VENOUS BLD VENIPUNCTURE: CPT | Performed by: EMERGENCY MEDICINE

## 2020-01-01 PROCEDURE — 83605 ASSAY OF LACTIC ACID: CPT | Performed by: EMERGENCY MEDICINE

## 2020-01-01 PROCEDURE — 93306 TTE W/DOPPLER COMPLETE: CPT | Performed by: INTERNAL MEDICINE

## 2020-01-01 PROCEDURE — 36569 INSJ PICC 5 YR+ W/O IMAGING: CPT

## 2020-01-01 PROCEDURE — 84145 PROCALCITONIN (PCT): CPT | Performed by: NURSE PRACTITIONER

## 2020-01-01 PROCEDURE — NC001 PR NO CHARGE: Performed by: NURSE PRACTITIONER

## 2020-01-01 PROCEDURE — 87281 PNEUMOCYSTIS CARINII AG IF: CPT | Performed by: INTERNAL MEDICINE

## 2020-01-01 PROCEDURE — 85007 BL SMEAR W/DIFF WBC COUNT: CPT | Performed by: NURSE PRACTITIONER

## 2020-01-01 PROCEDURE — 85610 PROTHROMBIN TIME: CPT | Performed by: NURSE PRACTITIONER

## 2020-01-01 PROCEDURE — 94660 CPAP INITIATION&MGMT: CPT

## 2020-01-01 PROCEDURE — 82948 REAGENT STRIP/BLOOD GLUCOSE: CPT

## 2020-01-01 PROCEDURE — 82805 BLOOD GASES W/O2 SATURATION: CPT | Performed by: NURSE PRACTITIONER

## 2020-01-01 PROCEDURE — 31624 DX BRONCHOSCOPE/LAVAGE: CPT | Performed by: INTERNAL MEDICINE

## 2020-01-01 PROCEDURE — 84100 ASSAY OF PHOSPHORUS: CPT | Performed by: NURSE PRACTITIONER

## 2020-01-01 PROCEDURE — 0BH17EZ INSERTION OF ENDOTRACHEAL AIRWAY INTO TRACHEA, VIA NATURAL OR ARTIFICIAL OPENING: ICD-10-PCS | Performed by: INTERNAL MEDICINE

## 2020-01-01 PROCEDURE — 84145 PROCALCITONIN (PCT): CPT | Performed by: EMERGENCY MEDICINE

## 2020-01-01 PROCEDURE — 86900 BLOOD TYPING SEROLOGIC ABO: CPT | Performed by: NURSE PRACTITIONER

## 2020-01-01 PROCEDURE — 83735 ASSAY OF MAGNESIUM: CPT | Performed by: NURSE PRACTITIONER

## 2020-01-01 PROCEDURE — 96361 HYDRATE IV INFUSION ADD-ON: CPT

## 2020-01-01 PROCEDURE — 82140 ASSAY OF AMMONIA: CPT | Performed by: EMERGENCY MEDICINE

## 2020-01-01 PROCEDURE — 87252 VIRUS INOCULATION TISSUE: CPT | Performed by: INTERNAL MEDICINE

## 2020-01-01 PROCEDURE — 87070 CULTURE OTHR SPECIMN AEROBIC: CPT | Performed by: NURSE PRACTITIONER

## 2020-01-01 PROCEDURE — 88112 CYTOPATH CELL ENHANCE TECH: CPT | Performed by: PATHOLOGY

## 2020-01-01 PROCEDURE — 85730 THROMBOPLASTIN TIME PARTIAL: CPT | Performed by: EMERGENCY MEDICINE

## 2020-01-01 PROCEDURE — 96374 THER/PROPH/DIAG INJ IV PUSH: CPT

## 2020-01-01 PROCEDURE — 94640 AIRWAY INHALATION TREATMENT: CPT

## 2020-01-01 PROCEDURE — 5A1935Z RESPIRATORY VENTILATION, LESS THAN 24 CONSECUTIVE HOURS: ICD-10-PCS | Performed by: EMERGENCY MEDICINE

## 2020-01-01 PROCEDURE — 71045 X-RAY EXAM CHEST 1 VIEW: CPT

## 2020-01-01 PROCEDURE — NC001 PR NO CHARGE: Performed by: INTERNAL MEDICINE

## 2020-01-01 PROCEDURE — 89051 BODY FLUID CELL COUNT: CPT | Performed by: PATHOLOGY

## 2020-01-01 PROCEDURE — 87040 BLOOD CULTURE FOR BACTERIA: CPT | Performed by: EMERGENCY MEDICINE

## 2020-01-01 PROCEDURE — 31500 INSERT EMERGENCY AIRWAY: CPT | Performed by: EMERGENCY MEDICINE

## 2020-01-01 PROCEDURE — 88305 TISSUE EXAM BY PATHOLOGIST: CPT | Performed by: PATHOLOGY

## 2020-01-01 PROCEDURE — 99285 EMERGENCY DEPT VISIT HI MDM: CPT | Performed by: EMERGENCY MEDICINE

## 2020-01-01 PROCEDURE — 82805 BLOOD GASES W/O2 SATURATION: CPT | Performed by: EMERGENCY MEDICINE

## 2020-01-01 PROCEDURE — 94002 VENT MGMT INPAT INIT DAY: CPT

## 2020-01-01 PROCEDURE — 84484 ASSAY OF TROPONIN QUANT: CPT | Performed by: EMERGENCY MEDICINE

## 2020-01-01 PROCEDURE — 99292 CRITICAL CARE ADDL 30 MIN: CPT | Performed by: INTERNAL MEDICINE

## 2020-01-01 PROCEDURE — 96365 THER/PROPH/DIAG IV INF INIT: CPT

## 2020-01-01 PROCEDURE — 87102 FUNGUS ISOLATION CULTURE: CPT | Performed by: INTERNAL MEDICINE

## 2020-01-01 PROCEDURE — 82330 ASSAY OF CALCIUM: CPT | Performed by: NURSE PRACTITIONER

## 2020-01-01 PROCEDURE — 5A19054 RESPIRATORY VENTILATION, SINGLE, NONMECHANICAL: ICD-10-PCS | Performed by: INTERNAL MEDICINE

## 2020-01-01 PROCEDURE — 87015 SPECIMEN INFECT AGNT CONCNTJ: CPT | Performed by: INTERNAL MEDICINE

## 2020-01-01 PROCEDURE — 87205 SMEAR GRAM STAIN: CPT | Performed by: NURSE PRACTITIONER

## 2020-01-01 PROCEDURE — 5A1945Z RESPIRATORY VENTILATION, 24-96 CONSECUTIVE HOURS: ICD-10-PCS | Performed by: INTERNAL MEDICINE

## 2020-01-01 PROCEDURE — 87070 CULTURE OTHR SPECIMN AEROBIC: CPT | Performed by: INTERNAL MEDICINE

## 2020-01-01 PROCEDURE — 94003 VENT MGMT INPAT SUBQ DAY: CPT

## 2020-01-01 PROCEDURE — 85027 COMPLETE CBC AUTOMATED: CPT | Performed by: INTERNAL MEDICINE

## 2020-01-01 PROCEDURE — 80053 COMPREHEN METABOLIC PANEL: CPT | Performed by: EMERGENCY MEDICINE

## 2020-01-01 PROCEDURE — 86901 BLOOD TYPING SEROLOGIC RH(D): CPT | Performed by: NURSE PRACTITIONER

## 2020-01-01 PROCEDURE — 86920 COMPATIBILITY TEST SPIN: CPT

## 2020-01-01 PROCEDURE — 30233N1 TRANSFUSION OF NONAUTOLOGOUS RED BLOOD CELLS INTO PERIPHERAL VEIN, PERCUTANEOUS APPROACH: ICD-10-PCS | Performed by: INTERNAL MEDICINE

## 2020-01-01 PROCEDURE — 93005 ELECTROCARDIOGRAM TRACING: CPT

## 2020-01-01 PROCEDURE — 0B988ZX DRAINAGE OF LEFT UPPER LOBE BRONCHUS, VIA NATURAL OR ARTIFICIAL OPENING ENDOSCOPIC, DIAGNOSTIC: ICD-10-PCS | Performed by: INTERNAL MEDICINE

## 2020-01-01 PROCEDURE — 85610 PROTHROMBIN TIME: CPT | Performed by: EMERGENCY MEDICINE

## 2020-01-01 PROCEDURE — 84484 ASSAY OF TROPONIN QUANT: CPT | Performed by: NURSE PRACTITIONER

## 2020-01-01 PROCEDURE — 31500 INSERT EMERGENCY AIRWAY: CPT | Performed by: NURSE PRACTITIONER

## 2020-01-01 PROCEDURE — 0BH17EZ INSERTION OF ENDOTRACHEAL AIRWAY INTO TRACHEA, VIA NATURAL OR ARTIFICIAL OPENING: ICD-10-PCS | Performed by: EMERGENCY MEDICINE

## 2020-01-01 PROCEDURE — 99291 CRITICAL CARE FIRST HOUR: CPT | Performed by: INTERNAL MEDICINE

## 2020-01-01 PROCEDURE — 87449 NOS EACH ORGANISM AG IA: CPT | Performed by: NURSE PRACTITIONER

## 2020-01-01 PROCEDURE — 96376 TX/PRO/DX INJ SAME DRUG ADON: CPT

## 2020-01-01 PROCEDURE — 99285 EMERGENCY DEPT VISIT HI MDM: CPT

## 2020-01-01 PROCEDURE — 81003 URINALYSIS AUTO W/O SCOPE: CPT

## 2020-01-01 PROCEDURE — 5A12012 PERFORMANCE OF CARDIAC OUTPUT, SINGLE, MANUAL: ICD-10-PCS | Performed by: INTERNAL MEDICINE

## 2020-01-01 PROCEDURE — 87116 MYCOBACTERIA CULTURE: CPT | Performed by: INTERNAL MEDICINE

## 2020-01-01 PROCEDURE — NC001 PR NO CHARGE: Performed by: EMERGENCY MEDICINE

## 2020-01-01 PROCEDURE — 71275 CT ANGIOGRAPHY CHEST: CPT

## 2020-01-01 PROCEDURE — 83880 ASSAY OF NATRIURETIC PEPTIDE: CPT | Performed by: EMERGENCY MEDICINE

## 2020-01-01 PROCEDURE — 99291 CRITICAL CARE FIRST HOUR: CPT | Performed by: NURSE PRACTITIONER

## 2020-01-01 PROCEDURE — 87206 SMEAR FLUORESCENT/ACID STAI: CPT | Performed by: INTERNAL MEDICINE

## 2020-01-01 PROCEDURE — P9016 RBC LEUKOCYTES REDUCED: HCPCS

## 2020-01-01 PROCEDURE — 86850 RBC ANTIBODY SCREEN: CPT | Performed by: NURSE PRACTITIONER

## 2020-01-01 PROCEDURE — C1751 CATH, INF, PER/CENT/MIDLINE: HCPCS

## 2020-01-01 PROCEDURE — 85610 PROTHROMBIN TIME: CPT | Performed by: INTERNAL MEDICINE

## 2020-01-01 PROCEDURE — 93010 ELECTROCARDIOGRAM REPORT: CPT | Performed by: INTERNAL MEDICINE

## 2020-01-01 PROCEDURE — 02HV33Z INSERTION OF INFUSION DEVICE INTO SUPERIOR VENA CAVA, PERCUTANEOUS APPROACH: ICD-10-PCS | Performed by: INTERNAL MEDICINE

## 2020-01-01 PROCEDURE — 93306 TTE W/DOPPLER COMPLETE: CPT

## 2020-01-01 PROCEDURE — 80202 ASSAY OF VANCOMYCIN: CPT | Performed by: INTERNAL MEDICINE

## 2020-01-01 PROCEDURE — 87631 RESP VIRUS 3-5 TARGETS: CPT | Performed by: EMERGENCY MEDICINE

## 2020-01-01 RX ORDER — HYDROXYUREA 500 MG/1
500 CAPSULE ORAL DAILY
COMMUNITY
End: 2020-01-01 | Stop reason: HOSPADM

## 2020-01-01 RX ORDER — PROPOFOL 10 MG/ML
5-50 INJECTION, EMULSION INTRAVENOUS
Status: DISCONTINUED | OUTPATIENT
Start: 2020-01-01 | End: 2020-01-01 | Stop reason: HOSPADM

## 2020-01-01 RX ORDER — CHLORHEXIDINE GLUCONATE 0.12 MG/ML
15 RINSE ORAL EVERY 12 HOURS SCHEDULED
Status: DISCONTINUED | OUTPATIENT
Start: 2020-01-01 | End: 2020-01-01

## 2020-01-01 RX ORDER — FENTANYL CITRATE 50 UG/ML
100 INJECTION, SOLUTION INTRAMUSCULAR; INTRAVENOUS ONCE
Status: COMPLETED | OUTPATIENT
Start: 2020-01-01 | End: 2020-01-01

## 2020-01-01 RX ORDER — ACETAMINOPHEN 650 MG/1
650 SUPPOSITORY RECTAL ONCE
Status: COMPLETED | OUTPATIENT
Start: 2020-01-01 | End: 2020-01-01

## 2020-01-01 RX ORDER — HYDROXYCHLOROQUINE SULFATE 200 MG/1
200 TABLET, FILM COATED ORAL 2 TIMES DAILY WITH MEALS
COMMUNITY
End: 2020-01-01 | Stop reason: HOSPADM

## 2020-01-01 RX ORDER — PANTOPRAZOLE SODIUM 40 MG/1
40 TABLET, DELAYED RELEASE ORAL
Status: DISCONTINUED | OUTPATIENT
Start: 2020-01-01 | End: 2020-01-01

## 2020-01-01 RX ORDER — SUCCINYLCHOLINE/SOD CL,ISO/PF 100 MG/5ML
100 SYRINGE (ML) INTRAVENOUS ONCE
Status: COMPLETED | OUTPATIENT
Start: 2020-01-01 | End: 2020-01-01

## 2020-01-01 RX ORDER — ACETAMINOPHEN 325 MG/1
650 TABLET ORAL EVERY 6 HOURS PRN
Status: DISCONTINUED | OUTPATIENT
Start: 2020-01-01 | End: 2020-01-01 | Stop reason: HOSPADM

## 2020-01-01 RX ORDER — LEVOTHYROXINE SODIUM 0.05 MG/1
50 TABLET ORAL
Status: DISCONTINUED | OUTPATIENT
Start: 2020-01-01 | End: 2020-01-01 | Stop reason: HOSPADM

## 2020-01-01 RX ORDER — FENTANYL CITRATE-0.9 % NACL/PF 10 MCG/ML
50 PLASTIC BAG, INJECTION (ML) INTRAVENOUS CONTINUOUS
Status: DISCONTINUED | OUTPATIENT
Start: 2020-01-01 | End: 2020-01-01

## 2020-01-01 RX ORDER — CALCIUM GLUCONATE 20 MG/ML
1 INJECTION, SOLUTION INTRAVENOUS ONCE
Status: COMPLETED | OUTPATIENT
Start: 2020-01-01 | End: 2020-01-01

## 2020-01-01 RX ORDER — CHLORHEXIDINE GLUCONATE 0.12 MG/ML
15 RINSE ORAL EVERY 12 HOURS SCHEDULED
Status: DISCONTINUED | OUTPATIENT
Start: 2020-01-01 | End: 2020-01-01 | Stop reason: HOSPADM

## 2020-01-01 RX ORDER — POTASSIUM CHLORIDE 20MEQ/15ML
20 LIQUID (ML) ORAL ONCE
Status: COMPLETED | OUTPATIENT
Start: 2020-01-01 | End: 2020-01-01

## 2020-01-01 RX ORDER — LIDOCAINE HYDROCHLORIDE 10 MG/ML
INJECTION, SOLUTION EPIDURAL; INFILTRATION; INTRACAUDAL; PERINEURAL
Status: DISPENSED
Start: 2020-01-01 | End: 2020-01-01

## 2020-01-01 RX ORDER — SODIUM CHLORIDE, SODIUM LACTATE, POTASSIUM CHLORIDE, CALCIUM CHLORIDE 600; 310; 30; 20 MG/100ML; MG/100ML; MG/100ML; MG/100ML
50 INJECTION, SOLUTION INTRAVENOUS CONTINUOUS
Status: DISCONTINUED | OUTPATIENT
Start: 2020-01-01 | End: 2020-01-01

## 2020-01-01 RX ORDER — FUROSEMIDE 10 MG/ML
40 INJECTION INTRAMUSCULAR; INTRAVENOUS ONCE
Status: COMPLETED | OUTPATIENT
Start: 2020-01-01 | End: 2020-01-01

## 2020-01-01 RX ORDER — LEVALBUTEROL 1.25 MG/.5ML
1.25 SOLUTION, CONCENTRATE RESPIRATORY (INHALATION) EVERY 8 HOURS PRN
Status: DISCONTINUED | OUTPATIENT
Start: 2020-01-01 | End: 2020-01-01 | Stop reason: HOSPADM

## 2020-01-01 RX ORDER — LEVOTHYROXINE SODIUM 0.05 MG/1
50 TABLET ORAL DAILY
COMMUNITY
End: 2020-01-01 | Stop reason: HOSPADM

## 2020-01-01 RX ORDER — HYDRALAZINE HYDROCHLORIDE 20 MG/ML
5 INJECTION INTRAMUSCULAR; INTRAVENOUS EVERY 6 HOURS PRN
Status: DISCONTINUED | OUTPATIENT
Start: 2020-01-01 | End: 2020-01-01 | Stop reason: HOSPADM

## 2020-01-01 RX ORDER — PREDNISONE 2.5 MG
7.5 TABLET ORAL DAILY
Status: DISCONTINUED | OUTPATIENT
Start: 2020-01-01 | End: 2020-01-01

## 2020-01-01 RX ORDER — ETOMIDATE 2 MG/ML
20 INJECTION INTRAVENOUS ONCE
Status: COMPLETED | OUTPATIENT
Start: 2020-01-01 | End: 2020-01-01

## 2020-01-01 RX ORDER — FENTANYL CITRATE 50 UG/ML
50 INJECTION, SOLUTION INTRAMUSCULAR; INTRAVENOUS ONCE
Status: COMPLETED | OUTPATIENT
Start: 2020-01-01 | End: 2020-01-01

## 2020-01-01 RX ORDER — INSULIN ASPART 100 [IU]/ML
40 INJECTION, SUSPENSION SUBCUTANEOUS
COMMUNITY
End: 2020-01-01 | Stop reason: HOSPADM

## 2020-01-01 RX ORDER — PROPOFOL 10 MG/ML
5-50 INJECTION, EMULSION INTRAVENOUS
Status: DISCONTINUED | OUTPATIENT
Start: 2020-01-01 | End: 2020-01-01

## 2020-01-01 RX ORDER — PRAVASTATIN SODIUM 80 MG/1
80 TABLET ORAL
Status: DISCONTINUED | OUTPATIENT
Start: 2020-01-01 | End: 2020-01-01 | Stop reason: HOSPADM

## 2020-01-01 RX ORDER — FENTANYL CITRATE 50 UG/ML
INJECTION, SOLUTION INTRAMUSCULAR; INTRAVENOUS
Status: DISPENSED
Start: 2020-01-01 | End: 2020-01-01

## 2020-01-01 RX ORDER — LOSARTAN POTASSIUM 25 MG/1
25 TABLET ORAL DAILY
Status: DISCONTINUED | OUTPATIENT
Start: 2020-01-01 | End: 2020-01-01 | Stop reason: HOSPADM

## 2020-01-01 RX ORDER — MAGNESIUM SULFATE HEPTAHYDRATE 40 MG/ML
2 INJECTION, SOLUTION INTRAVENOUS ONCE
Status: COMPLETED | OUTPATIENT
Start: 2020-01-01 | End: 2020-01-01

## 2020-01-01 RX ORDER — FENTANYL CITRATE-0.9 % NACL/PF 10 MCG/ML
50 PLASTIC BAG, INJECTION (ML) INTRAVENOUS CONTINUOUS
Status: DISCONTINUED | OUTPATIENT
Start: 2020-01-01 | End: 2020-01-01 | Stop reason: HOSPADM

## 2020-01-01 RX ORDER — IPRATROPIUM BROMIDE AND ALBUTEROL SULFATE 2.5; .5 MG/3ML; MG/3ML
3 SOLUTION RESPIRATORY (INHALATION)
Status: DISCONTINUED | OUTPATIENT
Start: 2020-01-01 | End: 2020-01-01

## 2020-01-01 RX ORDER — FENTANYL CITRATE/PF 50 MCG/ML
50 SYRINGE (ML) INJECTION EVERY 2 HOUR PRN
Status: DISCONTINUED | OUTPATIENT
Start: 2020-01-01 | End: 2020-01-01

## 2020-01-01 RX ORDER — LOSARTAN POTASSIUM 25 MG/1
25 TABLET ORAL DAILY
COMMUNITY
End: 2020-01-01 | Stop reason: HOSPADM

## 2020-01-01 RX ORDER — WARFARIN SODIUM 5 MG/1
5 TABLET ORAL
COMMUNITY
End: 2020-01-01 | Stop reason: HOSPADM

## 2020-01-01 RX ORDER — HEPARIN SODIUM 5000 [USP'U]/ML
5000 INJECTION, SOLUTION INTRAVENOUS; SUBCUTANEOUS EVERY 8 HOURS SCHEDULED
Status: DISCONTINUED | OUTPATIENT
Start: 2020-01-01 | End: 2020-01-01 | Stop reason: HOSPADM

## 2020-01-01 RX ORDER — NOREPINEPHRINE BITARTRATE 1 MG/ML
INJECTION, SOLUTION INTRAVENOUS
Status: DISCONTINUED
Start: 2020-01-01 | End: 2020-01-01 | Stop reason: HOSPADM

## 2020-01-01 RX ORDER — OMEPRAZOLE 20 MG/1
20 CAPSULE, DELAYED RELEASE ORAL DAILY
COMMUNITY
End: 2020-01-01 | Stop reason: HOSPADM

## 2020-01-01 RX ORDER — FENTANYL CITRATE 50 UG/ML
INJECTION, SOLUTION INTRAMUSCULAR; INTRAVENOUS
Status: COMPLETED
Start: 2020-01-01 | End: 2020-01-01

## 2020-01-01 RX ORDER — EZETIMIBE 10 MG/1
10 TABLET ORAL DAILY
COMMUNITY
End: 2020-01-01 | Stop reason: HOSPADM

## 2020-01-01 RX ORDER — SIMVASTATIN 40 MG
40 TABLET ORAL
COMMUNITY
End: 2020-01-01 | Stop reason: HOSPADM

## 2020-01-01 RX ORDER — FLUTICASONE PROPIONATE 50 MCG
2 SPRAY, SUSPENSION (ML) NASAL DAILY
COMMUNITY
End: 2020-01-01 | Stop reason: HOSPADM

## 2020-01-01 RX ORDER — LIDOCAINE HYDROCHLORIDE 10 MG/ML
INJECTION, SOLUTION EPIDURAL; INFILTRATION; INTRACAUDAL; PERINEURAL
Status: COMPLETED
Start: 2020-01-01 | End: 2020-01-01

## 2020-01-01 RX ORDER — PREDNISONE 10 MG/1
7.5 TABLET ORAL DAILY
COMMUNITY
End: 2020-01-01 | Stop reason: HOSPADM

## 2020-01-01 RX ORDER — HYDROXYCHLOROQUINE SULFATE 200 MG/1
200 TABLET, FILM COATED ORAL 2 TIMES DAILY WITH MEALS
Status: DISCONTINUED | OUTPATIENT
Start: 2020-01-01 | End: 2020-01-01 | Stop reason: HOSPADM

## 2020-01-01 RX ORDER — EZETIMIBE 10 MG/1
10 TABLET ORAL DAILY
Status: DISCONTINUED | OUTPATIENT
Start: 2020-01-01 | End: 2020-01-01 | Stop reason: HOSPADM

## 2020-01-01 RX ADMIN — PROPOFOL 25 MCG/KG/MIN: 10 INJECTION, EMULSION INTRAVENOUS at 08:58

## 2020-01-01 RX ADMIN — HYDROXYCHLOROQUINE SULFATE 200 MG: 200 TABLET, FILM COATED ORAL at 16:12

## 2020-01-01 RX ADMIN — Medication 20 MG: at 09:08

## 2020-01-01 RX ADMIN — EZETIMIBE 10 MG: 10 TABLET ORAL at 09:58

## 2020-01-01 RX ADMIN — PRAVASTATIN SODIUM 80 MG: 80 TABLET ORAL at 16:18

## 2020-01-01 RX ADMIN — Medication 20 MG: at 09:19

## 2020-01-01 RX ADMIN — Medication 25 MCG/HR: at 22:33

## 2020-01-01 RX ADMIN — AZITHROMYCIN MONOHYDRATE 500 MG: 500 INJECTION, POWDER, LYOPHILIZED, FOR SOLUTION INTRAVENOUS at 03:55

## 2020-01-01 RX ADMIN — VANCOMYCIN HYDROCHLORIDE 750 MG: 1 INJECTION, POWDER, LYOPHILIZED, FOR SOLUTION INTRAVENOUS at 00:00

## 2020-01-01 RX ADMIN — PROPOFOL 20 MCG/KG/MIN: 10 INJECTION, EMULSION INTRAVENOUS at 10:11

## 2020-01-01 RX ADMIN — LEVOTHYROXINE SODIUM 50 MCG: 50 TABLET ORAL at 06:24

## 2020-01-01 RX ADMIN — IPRATROPIUM BROMIDE AND ALBUTEROL SULFATE 3 ML: 2.5; .5 SOLUTION RESPIRATORY (INHALATION) at 20:05

## 2020-01-01 RX ADMIN — IPRATROPIUM BROMIDE AND ALBUTEROL SULFATE 3 ML: 2.5; .5 SOLUTION RESPIRATORY (INHALATION) at 07:10

## 2020-01-01 RX ADMIN — PROPOFOL 35 MCG/KG/MIN: 10 INJECTION, EMULSION INTRAVENOUS at 05:52

## 2020-01-01 RX ADMIN — EPINEPHRINE 1 MG: 0.1 INJECTION, SOLUTION ENDOTRACHEAL; INTRACARDIAC; INTRAVENOUS at 15:35

## 2020-01-01 RX ADMIN — IPRATROPIUM BROMIDE AND ALBUTEROL SULFATE 3 ML: 2.5; .5 SOLUTION RESPIRATORY (INHALATION) at 01:00

## 2020-01-01 RX ADMIN — INSULIN LISPRO 3 UNITS: 100 INJECTION, SOLUTION INTRAVENOUS; SUBCUTANEOUS at 13:25

## 2020-01-01 RX ADMIN — VANCOMYCIN HYDROCHLORIDE 750 MG: 1 INJECTION, POWDER, LYOPHILIZED, FOR SOLUTION INTRAVENOUS at 13:55

## 2020-01-01 RX ADMIN — HYDROXYCHLOROQUINE SULFATE 200 MG: 200 TABLET, FILM COATED ORAL at 16:25

## 2020-01-01 RX ADMIN — INSULIN LISPRO 1 UNITS: 100 INJECTION, SOLUTION INTRAVENOUS; SUBCUTANEOUS at 01:00

## 2020-01-01 RX ADMIN — PRAVASTATIN SODIUM 80 MG: 80 TABLET ORAL at 16:34

## 2020-01-01 RX ADMIN — PROPOFOL 25 MCG/KG/MIN: 10 INJECTION, EMULSION INTRAVENOUS at 18:33

## 2020-01-01 RX ADMIN — EZETIMIBE 10 MG: 10 TABLET ORAL at 09:08

## 2020-01-01 RX ADMIN — IPRATROPIUM BROMIDE AND ALBUTEROL SULFATE 3 ML: 2.5; .5 SOLUTION RESPIRATORY (INHALATION) at 07:40

## 2020-01-01 RX ADMIN — CEFEPIME HYDROCHLORIDE 2000 MG: 2 INJECTION, POWDER, FOR SOLUTION INTRAVENOUS at 09:54

## 2020-01-01 RX ADMIN — VANCOMYCIN HYDROCHLORIDE 750 MG: 1 INJECTION, POWDER, LYOPHILIZED, FOR SOLUTION INTRAVENOUS at 11:51

## 2020-01-01 RX ADMIN — PROPOFOL 25 MCG/KG/MIN: 10 INJECTION, EMULSION INTRAVENOUS at 23:50

## 2020-01-01 RX ADMIN — ACETAMINOPHEN 650 MG: 325 TABLET ORAL at 18:42

## 2020-01-01 RX ADMIN — METRONIDAZOLE 500 MG: 500 INJECTION, SOLUTION INTRAVENOUS at 10:03

## 2020-01-01 RX ADMIN — SODIUM CHLORIDE 1000 ML: 0.9 INJECTION, SOLUTION INTRAVENOUS at 23:33

## 2020-01-01 RX ADMIN — SODIUM CHLORIDE 1000 ML: 0.9 INJECTION, SOLUTION INTRAVENOUS at 00:46

## 2020-01-01 RX ADMIN — EPINEPHRINE 1 MG: 0.1 INJECTION, SOLUTION ENDOTRACHEAL; INTRACARDIAC; INTRAVENOUS at 15:44

## 2020-01-01 RX ADMIN — FENTANYL CITRATE 100 MCG: 50 INJECTION INTRAMUSCULAR; INTRAVENOUS at 23:39

## 2020-01-01 RX ADMIN — EPINEPHRINE 1 MG: 0.1 INJECTION, SOLUTION ENDOTRACHEAL; INTRACARDIAC; INTRAVENOUS at 16:07

## 2020-01-01 RX ADMIN — VANCOMYCIN HYDROCHLORIDE 750 MG: 1 INJECTION, POWDER, LYOPHILIZED, FOR SOLUTION INTRAVENOUS at 13:30

## 2020-01-01 RX ADMIN — CHLORHEXIDINE GLUCONATE 0.12% ORAL RINSE 15 ML: 1.2 LIQUID ORAL at 21:07

## 2020-01-01 RX ADMIN — FENTANYL CITRATE 100 MCG: 50 INJECTION, SOLUTION INTRAMUSCULAR; INTRAVENOUS at 00:26

## 2020-01-01 RX ADMIN — PREDNISONE 7.5 MG: 2.5 TABLET ORAL at 16:18

## 2020-01-01 RX ADMIN — SODIUM BICARBONATE 50 MEQ: 84 INJECTION PARENTERAL at 16:07

## 2020-01-01 RX ADMIN — FENTANYL CITRATE 50 MCG: 50 INJECTION INTRAMUSCULAR; INTRAVENOUS at 23:43

## 2020-01-01 RX ADMIN — EZETIMIBE 10 MG: 10 TABLET ORAL at 16:20

## 2020-01-01 RX ADMIN — Medication 100 MG: at 23:13

## 2020-01-01 RX ADMIN — METRONIDAZOLE 500 MG: 500 INJECTION, SOLUTION INTRAVENOUS at 01:29

## 2020-01-01 RX ADMIN — LOSARTAN POTASSIUM 25 MG: 25 TABLET, FILM COATED ORAL at 09:53

## 2020-01-01 RX ADMIN — CEFEPIME HYDROCHLORIDE 2000 MG: 2 INJECTION, POWDER, FOR SOLUTION INTRAVENOUS at 20:41

## 2020-01-01 RX ADMIN — INSULIN LISPRO 5 UNITS: 100 INJECTION, SOLUTION INTRAVENOUS; SUBCUTANEOUS at 06:34

## 2020-01-01 RX ADMIN — HYDROXYCHLOROQUINE SULFATE 200 MG: 200 TABLET, FILM COATED ORAL at 16:36

## 2020-01-01 RX ADMIN — EPINEPHRINE 0.5 MG: 0.1 INJECTION, SOLUTION ENDOTRACHEAL; INTRACARDIAC; INTRAVENOUS at 15:55

## 2020-01-01 RX ADMIN — PROPOFOL 20 MCG/KG/MIN: 10 INJECTION, EMULSION INTRAVENOUS at 23:25

## 2020-01-01 RX ADMIN — CHLORHEXIDINE GLUCONATE 0.12% ORAL RINSE 15 ML: 1.2 LIQUID ORAL at 09:59

## 2020-01-01 RX ADMIN — SODIUM CHLORIDE 1000 MG: 0.9 INJECTION, SOLUTION INTRAVENOUS at 09:56

## 2020-01-01 RX ADMIN — METRONIDAZOLE 500 MG: 500 INJECTION, SOLUTION INTRAVENOUS at 10:18

## 2020-01-01 RX ADMIN — FENTANYL CITRATE 50 MCG: 50 INJECTION, SOLUTION INTRAMUSCULAR; INTRAVENOUS at 01:05

## 2020-01-01 RX ADMIN — HYDROXYCHLOROQUINE SULFATE 200 MG: 200 TABLET, FILM COATED ORAL at 16:20

## 2020-01-01 RX ADMIN — CHLORHEXIDINE GLUCONATE 0.12% ORAL RINSE 15 ML: 1.2 LIQUID ORAL at 08:56

## 2020-01-01 RX ADMIN — METRONIDAZOLE 500 MG: 500 INJECTION, SOLUTION INTRAVENOUS at 17:37

## 2020-01-01 RX ADMIN — INSULIN LISPRO 1 UNITS: 100 INJECTION, SOLUTION INTRAVENOUS; SUBCUTANEOUS at 13:25

## 2020-01-01 RX ADMIN — IPRATROPIUM BROMIDE AND ALBUTEROL SULFATE 3 ML: 2.5; .5 SOLUTION RESPIRATORY (INHALATION) at 13:43

## 2020-01-01 RX ADMIN — METRONIDAZOLE 500 MG: 500 INJECTION, SOLUTION INTRAVENOUS at 09:58

## 2020-01-01 RX ADMIN — IPRATROPIUM BROMIDE AND ALBUTEROL SULFATE 3 ML: 2.5; .5 SOLUTION RESPIRATORY (INHALATION) at 03:13

## 2020-01-01 RX ADMIN — CHLORHEXIDINE GLUCONATE 0.12% ORAL RINSE 15 ML: 1.2 LIQUID ORAL at 20:12

## 2020-01-01 RX ADMIN — INSULIN LISPRO 3 UNITS: 100 INJECTION, SOLUTION INTRAVENOUS; SUBCUTANEOUS at 17:46

## 2020-01-01 RX ADMIN — METRONIDAZOLE 500 MG: 500 INJECTION, SOLUTION INTRAVENOUS at 00:45

## 2020-01-01 RX ADMIN — METRONIDAZOLE 500 MG: 500 INJECTION, SOLUTION INTRAVENOUS at 02:02

## 2020-01-01 RX ADMIN — EPINEPHRINE 0.5 MG: 0.1 INJECTION, SOLUTION ENDOTRACHEAL; INTRACARDIAC; INTRAVENOUS at 16:01

## 2020-01-01 RX ADMIN — CHLORHEXIDINE GLUCONATE 0.12% ORAL RINSE 15 ML: 1.2 LIQUID ORAL at 09:08

## 2020-01-01 RX ADMIN — SODIUM CHLORIDE 10 UNITS/HR: 9 INJECTION, SOLUTION INTRAVENOUS at 10:28

## 2020-01-01 RX ADMIN — MAGNESIUM SULFATE HEPTAHYDRATE 2 G: 40 INJECTION, SOLUTION INTRAVENOUS at 09:18

## 2020-01-01 RX ADMIN — HYDROXYCHLOROQUINE SULFATE 200 MG: 200 TABLET, FILM COATED ORAL at 07:56

## 2020-01-01 RX ADMIN — CEFEPIME HYDROCHLORIDE 2000 MG: 2 INJECTION, POWDER, FOR SOLUTION INTRAVENOUS at 21:07

## 2020-01-01 RX ADMIN — LEVOTHYROXINE SODIUM 50 MCG: 50 TABLET ORAL at 05:24

## 2020-01-01 RX ADMIN — VANCOMYCIN HYDROCHLORIDE 750 MG: 1 INJECTION, POWDER, LYOPHILIZED, FOR SOLUTION INTRAVENOUS at 22:15

## 2020-01-01 RX ADMIN — LOSARTAN POTASSIUM 25 MG: 25 TABLET, FILM COATED ORAL at 09:58

## 2020-01-01 RX ADMIN — CHLORHEXIDINE GLUCONATE 0.12% ORAL RINSE 15 ML: 1.2 LIQUID ORAL at 20:41

## 2020-01-01 RX ADMIN — EPINEPHRINE 0.5 MG: 0.1 INJECTION, SOLUTION ENDOTRACHEAL; INTRACARDIAC; INTRAVENOUS at 15:53

## 2020-01-01 RX ADMIN — HEPARIN SODIUM 5000 UNITS: 5000 INJECTION INTRAVENOUS; SUBCUTANEOUS at 14:20

## 2020-01-01 RX ADMIN — HYDROXYCHLOROQUINE SULFATE 200 MG: 200 TABLET, FILM COATED ORAL at 07:18

## 2020-01-01 RX ADMIN — Medication 20 MG: at 10:00

## 2020-01-01 RX ADMIN — ETOMIDATE 20 MG: 20 INJECTION, SOLUTION INTRAVENOUS at 23:12

## 2020-01-01 RX ADMIN — CEFEPIME HYDROCHLORIDE 2000 MG: 2 INJECTION, POWDER, FOR SOLUTION INTRAVENOUS at 09:20

## 2020-01-01 RX ADMIN — VANCOMYCIN HYDROCHLORIDE 1250 MG: 5 INJECTION, POWDER, LYOPHILIZED, FOR SOLUTION INTRAVENOUS at 11:06

## 2020-01-01 RX ADMIN — SODIUM BICARBONATE 50 MEQ: 84 INJECTION PARENTERAL at 15:36

## 2020-01-01 RX ADMIN — Medication 50 MCG/HR: at 01:39

## 2020-01-01 RX ADMIN — PROPOFOL 40 MCG/KG/MIN: 10 INJECTION, EMULSION INTRAVENOUS at 14:22

## 2020-01-01 RX ADMIN — PRAVASTATIN SODIUM 80 MG: 80 TABLET ORAL at 16:11

## 2020-01-01 RX ADMIN — PROPOFOL 15 MCG/KG/MIN: 10 INJECTION, EMULSION INTRAVENOUS at 08:57

## 2020-01-01 RX ADMIN — IPRATROPIUM BROMIDE AND ALBUTEROL SULFATE 3 ML: 2.5; .5 SOLUTION RESPIRATORY (INHALATION) at 19:28

## 2020-01-01 RX ADMIN — PROPOFOL 40 MCG/KG/MIN: 10 INJECTION, EMULSION INTRAVENOUS at 06:34

## 2020-01-01 RX ADMIN — FUROSEMIDE 40 MG: 10 INJECTION, SOLUTION INTRAMUSCULAR; INTRAVENOUS at 23:31

## 2020-01-01 RX ADMIN — CALCIUM CHLORIDE 1 G: 100 INJECTION PARENTERAL at 15:47

## 2020-01-01 RX ADMIN — FENTANYL CITRATE 50 MCG: 50 INJECTION, SOLUTION INTRAMUSCULAR; INTRAVENOUS at 00:30

## 2020-01-01 RX ADMIN — LOSARTAN POTASSIUM 25 MG: 25 TABLET, FILM COATED ORAL at 09:08

## 2020-01-01 RX ADMIN — PROPOFOL 40 MCG/KG/MIN: 10 INJECTION, EMULSION INTRAVENOUS at 23:03

## 2020-01-01 RX ADMIN — PROPOFOL 25 MCG/KG/MIN: 10 INJECTION, EMULSION INTRAVENOUS at 00:45

## 2020-01-01 RX ADMIN — EZETIMIBE 10 MG: 10 TABLET ORAL at 09:19

## 2020-01-01 RX ADMIN — CEFEPIME HYDROCHLORIDE 2000 MG: 2 INJECTION, POWDER, FOR SOLUTION INTRAVENOUS at 10:03

## 2020-01-01 RX ADMIN — CHLORHEXIDINE GLUCONATE 0.12% ORAL RINSE 15 ML: 1.2 LIQUID ORAL at 08:05

## 2020-01-01 RX ADMIN — ACETAMINOPHEN 650 MG: 650 SUPPOSITORY RECTAL at 00:36

## 2020-01-01 RX ADMIN — INSULIN LISPRO 4 UNITS: 100 INJECTION, SOLUTION INTRAVENOUS; SUBCUTANEOUS at 00:27

## 2020-01-01 RX ADMIN — PROPOFOL 31.93 MCG/KG/MIN: 10 INJECTION, EMULSION INTRAVENOUS at 22:12

## 2020-01-01 RX ADMIN — ACETAMINOPHEN 650 MG: 325 TABLET ORAL at 03:57

## 2020-01-01 RX ADMIN — INSULIN LISPRO 1 UNITS: 100 INJECTION, SOLUTION INTRAVENOUS; SUBCUTANEOUS at 17:44

## 2020-01-01 RX ADMIN — INSULIN LISPRO 4 UNITS: 100 INJECTION, SOLUTION INTRAVENOUS; SUBCUTANEOUS at 01:40

## 2020-01-01 RX ADMIN — PROPOFOL 40 MCG/KG/MIN: 10 INJECTION, EMULSION INTRAVENOUS at 08:25

## 2020-01-01 RX ADMIN — VANCOMYCIN HYDROCHLORIDE 750 MG: 1 INJECTION, POWDER, LYOPHILIZED, FOR SOLUTION INTRAVENOUS at 23:10

## 2020-01-01 RX ADMIN — PROPOFOL 40 MCG/KG/MIN: 10 INJECTION, EMULSION INTRAVENOUS at 18:16

## 2020-01-01 RX ADMIN — CEFTRIAXONE SODIUM 1000 MG: 10 INJECTION, POWDER, FOR SOLUTION INTRAVENOUS at 11:48

## 2020-01-01 RX ADMIN — HYDRALAZINE HYDROCHLORIDE 5 MG: 20 INJECTION INTRAMUSCULAR; INTRAVENOUS at 22:02

## 2020-01-01 RX ADMIN — PROPOFOL 20 MCG/KG/MIN: 10 INJECTION, EMULSION INTRAVENOUS at 16:12

## 2020-01-01 RX ADMIN — PREDNISONE 7.5 MG: 2.5 TABLET ORAL at 09:54

## 2020-01-01 RX ADMIN — LEVOTHYROXINE SODIUM 50 MCG: 50 TABLET ORAL at 16:18

## 2020-01-01 RX ADMIN — Medication 50 MCG/HR: at 09:08

## 2020-01-01 RX ADMIN — INSULIN LISPRO 4 UNITS: 100 INJECTION, SOLUTION INTRAVENOUS; SUBCUTANEOUS at 05:23

## 2020-01-01 RX ADMIN — Medication 50 MCG/HR: at 19:32

## 2020-01-01 RX ADMIN — IPRATROPIUM BROMIDE AND ALBUTEROL SULFATE 3 ML: 2.5; .5 SOLUTION RESPIRATORY (INHALATION) at 07:09

## 2020-01-01 RX ADMIN — METRONIDAZOLE 500 MG: 500 INJECTION, SOLUTION INTRAVENOUS at 17:40

## 2020-01-01 RX ADMIN — PROPOFOL 45 MCG/KG/MIN: 10 INJECTION, EMULSION INTRAVENOUS at 02:35

## 2020-01-01 RX ADMIN — AZITHROMYCIN MONOHYDRATE 500 MG: 500 INJECTION, POWDER, LYOPHILIZED, FOR SOLUTION INTRAVENOUS at 03:07

## 2020-01-01 RX ADMIN — LOSARTAN POTASSIUM 25 MG: 25 TABLET, FILM COATED ORAL at 16:18

## 2020-01-01 RX ADMIN — IPRATROPIUM BROMIDE AND ALBUTEROL SULFATE 3 ML: 2.5; .5 SOLUTION RESPIRATORY (INHALATION) at 13:10

## 2020-01-01 RX ADMIN — SODIUM CHLORIDE, SODIUM LACTATE, POTASSIUM CHLORIDE, AND CALCIUM CHLORIDE 50 ML/HR: .6; .31; .03; .02 INJECTION, SOLUTION INTRAVENOUS at 03:07

## 2020-01-01 RX ADMIN — IPRATROPIUM BROMIDE AND ALBUTEROL SULFATE 3 ML: 2.5; .5 SOLUTION RESPIRATORY (INHALATION) at 01:03

## 2020-01-01 RX ADMIN — INSULIN LISPRO 5 UNITS: 100 INJECTION, SOLUTION INTRAVENOUS; SUBCUTANEOUS at 23:58

## 2020-01-01 RX ADMIN — SODIUM CHLORIDE 1000 MG: 0.9 INJECTION, SOLUTION INTRAVENOUS at 10:00

## 2020-01-01 RX ADMIN — POTASSIUM CHLORIDE 20 MEQ: 20 SOLUTION ORAL at 06:24

## 2020-01-01 RX ADMIN — CEFEPIME HYDROCHLORIDE 2000 MG: 2 INJECTION, POWDER, FOR SOLUTION INTRAVENOUS at 00:46

## 2020-01-01 RX ADMIN — INSULIN LISPRO 3 UNITS: 100 INJECTION, SOLUTION INTRAVENOUS; SUBCUTANEOUS at 12:22

## 2020-01-01 RX ADMIN — Medication 20 MG: at 09:58

## 2020-01-01 RX ADMIN — LEVOTHYROXINE SODIUM 50 MCG: 50 TABLET ORAL at 09:53

## 2020-01-01 RX ADMIN — METRONIDAZOLE 500 MG: 500 INJECTION, SOLUTION INTRAVENOUS at 17:34

## 2020-01-01 RX ADMIN — IODIXANOL 85 ML: 320 INJECTION, SOLUTION INTRAVASCULAR at 02:25

## 2020-01-01 RX ADMIN — PRAVASTATIN SODIUM 80 MG: 80 TABLET ORAL at 16:25

## 2020-01-01 RX ADMIN — CEFEPIME HYDROCHLORIDE 2000 MG: 2 INJECTION, POWDER, FOR SOLUTION INTRAVENOUS at 09:06

## 2020-01-01 RX ADMIN — METRONIDAZOLE 500 MG: 500 INJECTION, SOLUTION INTRAVENOUS at 12:45

## 2020-01-01 RX ADMIN — LOSARTAN POTASSIUM 25 MG: 25 TABLET, FILM COATED ORAL at 08:58

## 2020-01-01 RX ADMIN — PROPOFOL 40 MCG/KG/MIN: 10 INJECTION, EMULSION INTRAVENOUS at 03:46

## 2020-01-01 RX ADMIN — Medication 50 MCG/HR: at 00:55

## 2020-01-01 RX ADMIN — SODIUM CHLORIDE 1000 MG: 0.9 INJECTION, SOLUTION INTRAVENOUS at 18:00

## 2020-01-01 RX ADMIN — Medication 50 MCG/HR: at 14:28

## 2020-01-01 RX ADMIN — LIDOCAINE HYDROCHLORIDE 300 MG: 10 INJECTION, SOLUTION EPIDURAL; INFILTRATION; INTRACAUDAL; PERINEURAL at 17:30

## 2020-01-01 RX ADMIN — CALCIUM GLUCONATE 1 G: 20 INJECTION, SOLUTION INTRAVENOUS at 09:14

## 2020-01-01 RX ADMIN — INSULIN LISPRO 5 UNITS: 100 INJECTION, SOLUTION INTRAVENOUS; SUBCUTANEOUS at 18:33

## 2020-01-01 RX ADMIN — INSULIN LISPRO 3 UNITS: 100 INJECTION, SOLUTION INTRAVENOUS; SUBCUTANEOUS at 06:33

## 2020-01-01 RX ADMIN — CHLORHEXIDINE GLUCONATE 0.12% ORAL RINSE 15 ML: 1.2 LIQUID ORAL at 08:58

## 2020-01-01 RX ADMIN — LEVOTHYROXINE SODIUM 50 MCG: 50 TABLET ORAL at 05:00

## 2020-01-01 RX ADMIN — HYDROXYCHLOROQUINE SULFATE 200 MG: 200 TABLET, FILM COATED ORAL at 07:58

## 2020-01-01 RX ADMIN — HYDROXYCHLOROQUINE SULFATE 200 MG: 200 TABLET, FILM COATED ORAL at 09:08

## 2020-01-28 NOTE — LETTER
To Whom It May Concern;    Mr Kit Serrato has been under the care of the ICU at [place] [address]  since 2020  He  on 2020  Please call the ICU for any further questions      Yessica HANNA-BC

## 2020-01-29 PROBLEM — R77.8 ELEVATED TROPONIN LEVEL NOT DUE MYOCARDIAL INFARCTION: Status: ACTIVE | Noted: 2020-01-01

## 2020-01-29 PROBLEM — I50.9 CHF (CONGESTIVE HEART FAILURE) (HCC): Status: ACTIVE | Noted: 2020-01-01

## 2020-01-29 PROBLEM — Z86.718 HISTORY OF DVT (DEEP VEIN THROMBOSIS): Status: ACTIVE | Noted: 2020-01-01

## 2020-01-29 PROBLEM — R65.21 SEPTIC SHOCK (HCC): Status: ACTIVE | Noted: 2020-01-01

## 2020-01-29 PROBLEM — I10 ESSENTIAL HYPERTENSION: Status: ACTIVE | Noted: 2020-01-01

## 2020-01-29 PROBLEM — J84.9 INTERSTITIAL LUNG DISEASE (HCC): Status: ACTIVE | Noted: 2020-01-01

## 2020-01-29 PROBLEM — R76.0 ANTIPHOSPHOLIPID ANTIBODY POSITIVE: Status: ACTIVE | Noted: 2020-01-01

## 2020-01-29 PROBLEM — N18.2 CKD (CHRONIC KIDNEY DISEASE), STAGE II: Status: ACTIVE | Noted: 2020-01-01

## 2020-01-29 PROBLEM — J96.21 ACUTE AND CHRONIC RESPIRATORY FAILURE WITH HYPOXIA (HCC): Status: ACTIVE | Noted: 2020-01-01

## 2020-01-29 PROBLEM — E11.9 DIABETES MELLITUS TYPE 2 IN NONOBESE (HCC): Status: ACTIVE | Noted: 2020-01-01

## 2020-01-29 PROBLEM — J18.9 COMMUNITY ACQUIRED PNEUMONIA: Status: ACTIVE | Noted: 2020-01-01

## 2020-01-29 PROBLEM — A41.9 SEPTIC SHOCK (HCC): Status: ACTIVE | Noted: 2020-01-01

## 2020-01-29 PROBLEM — D75.839 THROMBOCYTHEMIA: Status: ACTIVE | Noted: 2020-01-01

## 2020-01-29 NOTE — ASSESSMENT & PLAN NOTE
No results found for: HGBA1C    No results for input(s): POCGLU in the last 72 hours      Blood Sugar Average: Last 72 hrs:    11/2019 Hb A1C-7 6  Accuchecks with SSI coverage Q6 hours while NPO  Hold outpatient meds

## 2020-01-29 NOTE — ASSESSMENT & PLAN NOTE
Patient will be admitted to ICU for further work up and care requiring greater than 2 midnight stay    Likely secondary to multilobar pneumonia  Patient has a history of MSSA bacteremia in 2019 with unknown etiology  With lactic acidosis of 7, reduced to 2 following treatment  Patient blood pressure dropped after sedation initiated, now stabilized without pressors  Given 3L IVF in ED  Treated with cefepime  Blood cultures pending  Will attempt to obtain sputum culture  CT scan of chest with significant disease, R>L

## 2020-01-29 NOTE — ASSESSMENT & PLAN NOTE
Wt Readings from Last 3 Encounters:   01/28/20 78 3 kg (172 lb 9 9 oz)   09/15/16 83 2 kg (183 lb 8 oz)   03/17/16 82 7 kg (182 lb 6 1 oz)   Patient appears hypovolemic at this point  Given 3L IVF in Ed    6/2019 Echo with EF-65%  Monitor fluid status  Record I/Os

## 2020-01-29 NOTE — PLAN OF CARE
Problem: PAIN - ADULT  Goal: Verbalizes/displays adequate comfort level or baseline comfort level  Description  Interventions:  - Encourage patient to monitor pain and request assistance  - Assess pain using appropriate pain scale  - Administer analgesics based on type and severity of pain and evaluate response  - Implement non-pharmacological measures as appropriate and evaluate response  - Consider cultural and social influences on pain and pain management  - Notify physician/advanced practitioner if interventions unsuccessful or patient reports new pain  Outcome: Progressing     Problem: INFECTION - ADULT  Goal: Absence or prevention of progression during hospitalization  Description  INTERVENTIONS:  - Assess and monitor for signs and symptoms of infection  - Monitor lab/diagnostic results  - Monitor all insertion sites, i e  indwelling lines, tubes, and drains  - Monitor endotracheal if appropriate and nasal secretions for changes in amount and color  - [place] appropriate cooling/warming therapies per order  - Administer medications as ordered  - Instruct and encourage patient and family to use good hand hygiene technique  - Identify and instruct in appropriate isolation precautions for identified infection/condition  Outcome: Progressing  Goal: Absence of fever/infection during neutropenic period  Description  INTERVENTIONS:  - Monitor WBC    Outcome: Progressing     Problem: SAFETY ADULT  Goal: Patient will remain free of falls  Description  INTERVENTIONS:  - Assess patient frequently for physical needs  -  Identify cognitive and physical deficits and behaviors that affect risk of falls    -  [place] fall precautions as indicated by assessment   - Educate patient/family on patient safety including physical limitations  - Instruct patient to call for assistance with activity based on assessment  - Modify environment to reduce risk of injury  - Consider OT/PT consult to assist with strengthening/mobility  Outcome: Progressing  Goal: Maintain or return to baseline ADL function  Description  INTERVENTIONS:  -  Assess patient's ability to carry out ADLs; assess patient's baseline for ADL function and identify physical deficits which impact ability to perform ADLs (bathing, care of mouth/teeth, toileting, grooming, dressing, etc )  - Assess/evaluate cause of self-care deficits   - Assess range of motion  - Assess patient's mobility; develop plan if impaired  - Assess patient's need for assistive devices and provide as appropriate  - Encourage maximum independence but intervene and supervise when necessary  - Involve family in performance of ADLs  - Assess for home care needs following discharge   - Consider OT consult to assist with ADL evaluation and planning for discharge  - Provide patient education as appropriate  Outcome: Progressing  Goal: Maintain or return mobility status to optimal level  Description  INTERVENTIONS:  - Assess patient's baseline mobility status (ambulation, transfers, stairs, etc )    - Identify cognitive and physical deficits and behaviors that affect mobility  - Identify mobility aids required to assist with transfers and/or ambulation (gait belt, sit-to-stand, lift, walker, cane, etc )  - [place] fall precautions as indicated by assessment  - Record patient progress and toleration of activity level on Mobility SBAR; progress patient to next Phase/Stage  - Instruct patient to call for assistance with activity based on assessment  - Consider rehabilitation consult to assist with strengthening/weightbearing, etc   Outcome: Progressing     Problem: DISCHARGE PLANNING  Goal: Discharge to home or other facility with appropriate resources  Description  INTERVENTIONS:  - Identify barriers to discharge w/patient and caregiver  - Arrange for needed discharge resources and transportation as appropriate  - Identify discharge learning needs (meds, wound care, etc )  - Arrange for interpretive services to assist at discharge as needed  - Refer to Case Management Department for coordinating discharge planning if the patient needs post-hospital services based on physician/advanced practitioner order or complex needs related to functional status, cognitive ability, or social support system  Outcome: Progressing     Problem: Knowledge Deficit  Goal: Patient/family/caregiver demonstrates understanding of disease process, treatment plan, medications, and discharge instructions  Description  Complete learning assessment and assess knowledge base    Interventions:  - Provide teaching at level of understanding  - Provide teaching via preferred learning methods  Outcome: Progressing     Problem: SAFETY,RESTRAINT: NV/NON-SELF DESTRUCTIVE BEHAVIOR  Goal: Remains free of harm/injury (restraint for non violent/non self-detsructive behavior)  Description  INTERVENTIONS:  - Instruct patient/family regarding restraint use   - Assess and monitor physiologic and psychological status   - Provide interventions and comfort measures to meet assessed patient needs   - Identify and implement measures to help patient regain control  - Assess readiness for release of restraint   Outcome: Progressing  Goal: Returns to optimal restraint-free functioning  Description  INTERVENTIONS:  - Assess the patient's behavior and symptoms that indicate continued need for restraint  - Identify and implement measures to help patient regain control  - Assess readiness for release of restraint   Outcome: Progressing     Problem: Prexisting or High Potential for Compromised Skin Integrity  Goal: Skin integrity is maintained or improved  Description  INTERVENTIONS:  - Identify patients at risk for skin breakdown  - Assess and monitor skin integrity  - Assess and monitor nutrition and hydration status  - Monitor labs   - Assess for incontinence   - Turn and reposition patient  - Assist with mobility/ambulation  - Relieve pressure over bony prominences  - Avoid friction and shearing  - Provide appropriate hygiene as needed including keeping skin clean and dry  - Evaluate need for skin moisturizer/barrier cream  - Collaborate with interdisciplinary team   - Patient/family teaching  - Consider wound care consult   Outcome: Progressing     Problem: Potential for Falls  Goal: Patient will remain free of falls  Description  INTERVENTIONS:  - Assess patient frequently for physical needs  -  Identify cognitive and physical deficits and behaviors that affect risk of falls    -  [place] fall precautions as indicated by assessment   - Educate patient/family on patient safety including physical limitations  - Instruct patient to call for assistance with activity based on assessment  - Modify environment to reduce risk of injury  - Consider OT/PT consult to assist with strengthening/mobility  Outcome: Progressing     Problem: RESPIRATORY - ADULT  Goal: Achieves optimal ventilation and oxygenation  Description  INTERVENTIONS:  - Assess for changes in respiratory status  - Assess for changes in mentation and behavior  - Position to facilitate oxygenation and minimize respiratory effort  - Oxygen administered by appropriate delivery if ordered  - Initiate smoking cessation education as indicated  - Encourage broncho-pulmonary hygiene including cough, deep breathe, Incentive Spirometry  - Assess the need for suctioning and aspirate as needed  - Assess and instruct to report SOB or any respiratory difficulty  - Respiratory Therapy support as indicated  Outcome: Progressing

## 2020-01-29 NOTE — SEPSIS NOTE
Sepsis Note   Andre Kothari 76 y o  male MRN: [id number]  Unit/Bed#: ED 32 Encounter: [id number]      qSOFA     Row Name 01/29/20 0145 01/29/20 0130 01/29/20 0115 01/29/20 0100 01/29/20 0030    Altered mental status GCS < 15              Respiratory Rate > / =22  0  0  0  0  0    Systolic BP < / =806  0  0  0  0  1    Q Sofa Score  1  1  1  1  2    Row Name 01/29/20 0015 01/29/20 0008 01/28/20 2318 01/28/20 2311       Altered mental status GCS < 15        1     Respiratory Rate > / =22    0    1     Systolic BP < / =093  1  0  0       Q Sofa Score  2  1  2           Initial Sepsis Screening     Row Name 01/29/20 0101                Is the patient's history suggestive of a new or worsening infection? (!) Yes (Proceed)  -        Suspected source of infection  pneumonia  -MC        Are two or more of the following signs & symptoms of infection both present and new to the patient? (!) Yes (Proceed)  -        Indicate SIRS criteria  Hyperthemia > 38 3C (100 9F); Tachypnea > 20 resp per min  -        If the answer is yes to both questions, suspicion of sepsis is present          If severe sepsis is present AND tissue hypoperfusion perists in the hour after fluid resuscitation or lactate > 4, the patient meets criteria for SEPTIC SHOCK          Are any of the following organ dysfunction criteria present within 6 hours of suspected infection and SIRS criteria that are NOT considered to be chronic conditions? (!) Yes  -        Organ dysfunction  Lactate >/equal 4 0 mmol/L  -        Date of presentation of severe sepsis  01/29/20  -        Time of presentation of severe sepsis  0102  -MC        Tissue hypoperfusion persists in the hour after crystalloid fluid administration, evidenced, by either:          Was hypotension present within one hour of the conclusion of crystalloid fluid administration?           Date of presentation of septic shock          Time of presentation of septic shock   User Key  (r) = Recorded By, (t) = Taken By, (c) = Cosigned By    Initials Name Provider Type    [address], DO Resident               Default Flowsheet Data (last 720 hours)      Sepsis Reassess     Row Name 01/29/20 0152                   Repeat Volume Status and Tissue Perfusion Assessment Performed    Repeat Volume Status and Tissue Perfusion Assessment Performed  Yes  -MC           Volume Status and Tissue Perfusion Post Fluid Resuscitation * Must Document All *    Vital Signs Reviewed (HR, RR, BP, T)  Yes  -MC        Shock Index Reviewed  Yes  -MC        Arterial Oxygen Saturation Reviewed (POx, SaO2 or SpO2)  Yes (comment %) OdM0122%  -MC        Cardio  (!) Tachycardia  -MC        Pulmonary  Other (comment) Intubated, no wheezes  -MC        Capillary Refill          Peripheral Pulses          Peripheral Pulse          Skin  Warm  -MC        Urine output assessed  Adequate  -MC           *OR*   Intensive Monitoring- Must Document One of the Following Four *:    Vital Signs Reviewed          * Central Venous Pressure (CVP or RAP)          * Central Venous Oxygen (SVO2, ScvO2 or Oxygen saturation via central catheter)          * Bedside Cardiovascular US in IVC diameter and % collapse          * Passive Leg Raise OR Crystalloid Challenge            User Key  (r) = Recorded By, (t) = Taken By, (c) = Cosigned By    Initials Name Provider Type    [address], DO Resident

## 2020-01-29 NOTE — ED NOTES
100mg succs given via iv push by babatunde doherty at this time       Tasha Briseno RN  01/28/20 9486

## 2020-01-29 NOTE — PLAN OF CARE
Problem: PAIN - ADULT  Goal: Verbalizes/displays adequate comfort level or baseline comfort level  Description  Interventions:  - Encourage patient to monitor pain and request assistance  - Assess pain using appropriate pain scale  - Administer analgesics based on type and severity of pain and evaluate response  - Implement non-pharmacological measures as appropriate and evaluate response  - Consider cultural and social influences on pain and pain management  - Notify physician/advanced practitioner if interventions unsuccessful or patient reports new pain  Outcome: Progressing     Problem: INFECTION - ADULT  Goal: Absence or prevention of progression during hospitalization  Description  INTERVENTIONS:  - Assess and monitor for signs and symptoms of infection  - Monitor lab/diagnostic results  - Monitor all insertion sites, i e  indwelling lines, tubes, and drains  - Monitor endotracheal if appropriate and nasal secretions for changes in amount and color  - [place] appropriate cooling/warming therapies per order  - Administer medications as ordered  - Instruct and encourage patient and family to use good hand hygiene technique  - Identify and instruct in appropriate isolation precautions for identified infection/condition  Outcome: Progressing  Goal: Absence of fever/infection during neutropenic period  Description  INTERVENTIONS:  - Monitor WBC    Outcome: Progressing     Problem: SAFETY ADULT  Goal: Patient will remain free of falls  Description  INTERVENTIONS:  - Assess patient frequently for physical needs  -  Identify cognitive and physical deficits and behaviors that affect risk of falls    -  [place] fall precautions as indicated by assessment   - Educate patient/family on patient safety including physical limitations  - Instruct patient to call for assistance with activity based on assessment  - Modify environment to reduce risk of injury  - Consider OT/PT consult to assist with strengthening/mobility  Outcome: Progressing  Goal: Maintain or return to baseline ADL function  Description  INTERVENTIONS:  -  Assess patient's ability to carry out ADLs; assess patient's baseline for ADL function and identify physical deficits which impact ability to perform ADLs (bathing, care of mouth/teeth, toileting, grooming, dressing, etc )  - Assess/evaluate cause of self-care deficits   - Assess range of motion  - Assess patient's mobility; develop plan if impaired  - Assess patient's need for assistive devices and provide as appropriate  - Encourage maximum independence but intervene and supervise when necessary  - Involve family in performance of ADLs  - Assess for home care needs following discharge   - Consider OT consult to assist with ADL evaluation and planning for discharge  - Provide patient education as appropriate  Outcome: Progressing  Goal: Maintain or return mobility status to optimal level  Description  INTERVENTIONS:  - Assess patient's baseline mobility status (ambulation, transfers, stairs, etc )    - Identify cognitive and physical deficits and behaviors that affect mobility  - Identify mobility aids required to assist with transfers and/or ambulation (gait belt, sit-to-stand, lift, walker, cane, etc )  - [place] fall precautions as indicated by assessment  - Record patient progress and toleration of activity level on Mobility SBAR; progress patient to next Phase/Stage  - Instruct patient to call for assistance with activity based on assessment  - Consider rehabilitation consult to assist with strengthening/weightbearing, etc   Outcome: Progressing     Problem: DISCHARGE PLANNING  Goal: Discharge to home or other facility with appropriate resources  Description  INTERVENTIONS:  - Identify barriers to discharge w/patient and caregiver  - Arrange for needed discharge resources and transportation as appropriate  - Identify discharge learning needs (meds, wound care, etc )  - Arrange for interpretive services to assist at discharge as needed  - Refer to Case Management Department for coordinating discharge planning if the patient needs post-hospital services based on physician/advanced practitioner order or complex needs related to functional status, cognitive ability, or social support system  Outcome: Progressing     Problem: Knowledge Deficit  Goal: Patient/family/caregiver demonstrates understanding of disease process, treatment plan, medications, and discharge instructions  Description  Complete learning assessment and assess knowledge base    Interventions:  - Provide teaching at level of understanding  - Provide teaching via preferred learning methods  Outcome: Progressing     Problem: SAFETY,RESTRAINT: NV/NON-SELF DESTRUCTIVE BEHAVIOR  Goal: Remains free of harm/injury (restraint for non violent/non self-detsructive behavior)  Description  INTERVENTIONS:  - Instruct patient/family regarding restraint use   - Assess and monitor physiologic and psychological status   - Provide interventions and comfort measures to meet assessed patient needs   - Identify and implement measures to help patient regain control  - Assess readiness for release of restraint   Outcome: Progressing  Goal: Returns to optimal restraint-free functioning  Description  INTERVENTIONS:  - Assess the patient's behavior and symptoms that indicate continued need for restraint  - Identify and implement measures to help patient regain control  - Assess readiness for release of restraint   Outcome: Progressing

## 2020-01-29 NOTE — ASSESSMENT & PLAN NOTE
Patient reportedly found by EMS with agonal respirations, placed on Bipap enroute to hospital, on arrival to ED with tachypnea, dyspnea so he was urgently intubated  B/L airspace opacities in the setting of ILD noted on CT scan of chest  Antibiotics as above  Aggressive pulmonary toileting  VAP bundle  Duonebs Q6 hours ATC and xopenex prn

## 2020-01-29 NOTE — UTILIZATION REVIEW
Initial Clinical Review    Admission: Date/Time/Statement: Inpatient Admission Orders (From admission, onward)     Ordered        01/29/20 0143  Inpatient Admission (expected length of stay for this patient Order details is greater than two midnights)  Once                   Orders Placed This Encounter   Procedures    Inpatient Admission (expected length of stay for this patient Order details is greater than two midnights)     Standing Status:   Standing     Number of Occurrences:   1     Order Specific Question:   Admitting Physician     Answer:   Amy Hameed [[id number]]     Order Specific Question:   Level of Care     Answer:   Critical Care [15]     Order Specific Question:   Estimated length of stay     Answer:   More than 2 Midnights     Order Specific Question:   Certification     Answer:   I certify that inpatient services are medically necessary for this patient for a duration of greater than two midnights  See H&P and MD Progress Notes for additional information about the patient's course of treatment  ED Arrival Information     Expected Arrival Acuity Means of Arrival Escorted By Service Admission Type    - 1/28/2020 23:02 Immediate Ambulance [facility] ([address]) Critical Care/ICU Emergency    Arrival Complaint    Shortness Of Breath        Chief Complaint   Patient presents with    Respiratory Distress     per ems, pt found on floor agonally breathing, O2 sat 50% on arrival  pt has hx  pulmonary fibrosis  Assessment/Plan:      76  Y O male  Presents to ED  Via  EMS after being found  At home on the floor  With agonal respirations  Placed  On  Cpap  En route to ED  On  ED  Arrival, was urgently intubated  PMH  Is   Chronic kidney disease, stage  2,  DM  2, essential hypertension, MSSA  Bacteremia 2019,  Unknown etiology,    CHF, history of  DVT, interstitial lung disease  On home  O2  4L  ATC,   and thrombocytopenia    Ct   chest  Revealed   B/L airspace opacities in the setting of ILD    Labs   Showed  Lactic acid  Of  7 and  Elevated troponin  Blood  Pressure  Initially  Dropped, now  Stable without pressors  Admit  IP   ICU level of care with   Acute and  Chronic respiratory failure with  Hypoxia, Septic  Shock, elevated troponin not  From  MI, Interstitial lung disease and Pneumonia and  Plan is  Vent support,   Monitor labs and  Blood  Cultures,  ANDREIA, duonebs  And aggressive pulmonary toilet          ED Triage Vitals   Temperature Pulse Respirations Blood Pressure SpO2   01/29/20 0015 01/28/20 2311 01/28/20 2311 01/28/20 2318 01/28/20 2311   (!) 103 3 °F (39 6 °C) (!) 153 (!) 62 (!) 193/84 (!) 57 %      Temp Source Heart Rate Source Patient Position - Orthostatic VS BP Location FiO2 (%)   01/29/20 0709 01/29/20 0015 01/29/20 0709 01/29/20 0709 --   Bladder Monitor Lying Right arm       Pain Score       01/28/20 2339       No Pain        Wt Readings from Last 1 Encounters:   01/28/20 78 3 kg (172 lb 9 9 oz)     Additional Vital Signs:   01/29/20 0600  100 4 °F (38 °C)  94    102/50  72  100 %       01/29/20 0400  100 8 °F (38 2 °C)Abnormal   106Abnormal     85/51Abnormal   58  99 %       01/29/20 0300  101 5 °F (38 6 °C)Abnormal   114Abnormal     117/52  75  99 %       01/29/20 0145  101 5 °F (38 6 °C)Abnormal   125Abnormal   18  138/64    100 %  Other (comment)      O2 Device: intubated at 01/29/20 0145   01/29/20 0130  101 1 °F (38 4 °C)Abnormal   126Abnormal   18  134/61    100 %  Other (comment)      O2 Device: intubated at 01/29/20 0130   01/29/20 0115  101 5 °F (38 6 °C)Abnormal   108Abnormal   18  119/56    100 %  Other (comment)      O2 Device: intubated at 01/29/20 0115   01/29/20 0100  101 8 °F (38 8 °C)Abnormal   106Abnormal   18  112/55    100 %  Other (comment)      O2 Device: intubated at 01/29/20 0100   01/29/20 0030  103 3 °F (39 6 °C)Abnormal   112Abnormal   18  99/53    98 %       01/29/20 0015  103 3 °F (39 6 °C)Abnormal   112Abnormal    94/46Abnormal     100 %       01/29/20 0008    123Abnormal   18  104/43Abnormal     100 %       01/28/20 2319            100 %  Other (comment)      O2 Device: ett at 01/28/20 2319   01/28/20 2318        193/84Abnormal            01/28/20 2311    153Abnormal   62Abnormal       57 %Abnormal              Pertinent Labs/Diagnostic Test Results:   CTA  Chest  ( 1/29)      No evidence of central pulmonary embolism   Distal segmental branches and subsegmental branches are not well assessed on this study  Extensive consolidative changes in the right greater than left lower lobe, right middle lobe and to a lesser extent in the remainder of the lungs suspicious for multifocal pneumonia   Superimposed interlobular septal thickening could be related to   component of pulmonary edema or interstitial lung disease  Endotracheal tube terminates immediately above the level of the zana   Consider slight retraction of the tube  Bibasilar predominant reticulations and honeycombing consistent with history of pulmonary fibrosis  CXR  ( 1/29)   Findings suggest asymmetric pulmonary edema and/or pneumonia    EKG    Sinus  Tachycardia     QTc-427  Results from last 7 days   Lab Units 01/29/20  0638 01/28/20  2336   WBC Thousand/uL 9 58 9 69   HEMOGLOBIN g/dL 6 8* 8 8*   HEMATOCRIT % 21 3* 27 3*   PLATELETS Thousands/uL 319 496*   NEUTROS ABS Thousands/µL  --  7 67*   BANDS PCT % 6  --          Results from last 7 days   Lab Units 01/29/20  0638 01/29/20  0350 01/29/20  0008   SODIUM mmol/L 141  --  139   POTASSIUM mmol/L 3 8  --  3 3*   CHLORIDE mmol/L 109*  --  104   CO2 mmol/L 23  --  21   ANION GAP mmol/L 9  --  14*   BUN mg/dL 18  --  16   CREATININE mg/dL 1 16  --  1 23   EGFR ml/min/1 73sq m 62  --  57   CALCIUM mg/dL 7 1*  --  8 1*   CALCIUM, IONIZED mmol/L 1 07*  --   --    MAGNESIUM mg/dL  --  1 5*  --    PHOSPHORUS mg/dL  --  2 3  --      Results from last 7 days   Lab Units 01/29/20  0008 01/28/20  2334   AST U/L 24  --    ALT U/L 18  --    ALK PHOS U/L 74  --    TOTAL PROTEIN g/dL 7 0  --    ALBUMIN g/dL 3 1*  --    TOTAL BILIRUBIN mg/dL 0 29  --    AMMONIA umol/L  --  18     Results from last 7 days   Lab Units 01/29/20  0617   POC GLUCOSE mg/dl 84     Results from last 7 days   Lab Units 01/29/20  0638 01/29/20  0008   GLUCOSE RANDOM mg/dL 97 124      Results from last 7 days   Lab Units 01/29/20  0313 01/28/20  2349   PH ART  7 377 7 472*   PCO2 ART mm Hg 33 0* 26 0*   PO2 ART mm Hg 90 3 216 1*   HCO3 ART mmol/L 19 0* 18 6*   BASE EXC ART mmol/L -5 7 -4 3   O2 CONTENT ART mL/dL 9 2* 11 5*   O2 HGB, ARTERIAL % 94 5 98 1*   ABG SOURCE  Radial, Right Radial, Left                 Results from last 7 days   Lab Units 01/29/20  0927 01/29/20  0638 01/29/20  0314 01/29/20  0008   TROPONIN I ng/mL 3 36* 2 34* 2 50* 1 28*         Results from last 7 days   Lab Units 01/29/20  0008   PROTIME seconds 30 9*   INR  2 89*   PTT seconds 60*             Results from last 7 days   Lab Units 01/29/20  0135 01/28/20  2334   LACTIC ACID mmol/L 2 2* 7 3*             Results from last 7 days   Lab Units 01/29/20  0008   NT-PRO BNP pg/mL 299*           Results from last 7 days   Lab Units 01/28/20  2343   CLARITY UA  Clear   COLOR UA  Yellow   SPEC GRAV UA  1 020   PH UA  6 0   GLUCOSE UA mg/dl Negative   KETONES UA mg/dl Negative   BLOOD UA  Trace*   PROTEIN UA mg/dl 30 (1+)*   NITRITE UA  Negative   BILIRUBIN UA  Negative   UROBILINOGEN UA E U /dl 0 2   LEUKOCYTES UA  Negative   WBC UA /hpf 2-4*   RBC UA /hpf 1-2*   BACTERIA UA /hpf Occasional   EPITHELIAL CELLS WET PREP /hpf Occasional     Results from last 7 days   Lab Units 01/28/20  2338   INFLUENZA A PCR  None Detected   RSV PCR  None Detected                         Results from last 7 days   Lab Units 01/28/20  2336   BLOOD CULTURE  Received in Microbiology Lab  Culture in Progress  Received in Microbiology Lab  Culture in Progress       Results from last 7 days   Lab Units 01/29/20  0638   TOTAL COUNTED  100           ED Treatment:   Medication Administration from 01/28/2020 2302 to 01/29/2020 0248       Date/Time Order Dose Route Action Comments     01/28/2020 2322 propofol (DIPRIVAN) 1000 mg in 100 mL infusion (premix)   Intravenous Not Given      01/29/2020 0035 sodium chloride 0 9 % bolus 1,000 mL 0 mL Intravenous Stopped      01/28/2020 2333 sodium chloride 0 9 % bolus 1,000 mL 1,000 mL Intravenous New Bag      01/28/2020 2339 fentanyl citrate (PF) 100 MCG/2ML 100 mcg 100 mcg Intravenous Given      01/29/2020 0245 propofol (DIPRIVAN) 1000 mg in 100 mL infusion (premix) 20 mcg/kg/min Intravenous Rate/Dose Verify      01/29/2020 0142 propofol (DIPRIVAN) 1000 mg in 100 mL infusion (premix) 20 mcg/kg/min Intravenous Rate/Dose Change      01/29/2020 0001 propofol (DIPRIVAN) 1000 mg in 100 mL infusion (premix) 15 mcg/kg/min Intravenous Rate/Dose Change      01/28/2020 2325 propofol (DIPRIVAN) 1000 mg in 100 mL infusion (premix) 20 mcg/kg/min Intravenous New Bag      01/28/2020 2312 etomidate (AMIDATE) 2 mg/mL injection 20 mg 20 mg Intravenous Given      01/28/2020 2313 Succinylcholine Chloride 100 mg/5 mL syringe 100 mg 100 mg Intravenous Given      01/29/2020 0026 fentanyl citrate (PF) 100 MCG/2ML 100 mcg 100 mcg Intravenous Given      01/29/2020 0046 sodium chloride 0 9 % bolus 1,000 mL 1,000 mL Intravenous New Bag      01/29/2020 0132 sodium chloride 0 9 % bolus 1,000 mL 0 mL Intravenous Stopped      01/29/2020 0046 sodium chloride 0 9 % bolus 1,000 mL 1,000 mL Intravenous New Bag      01/29/2020 0036 acetaminophen (TYLENOL) rectal suppository 650 mg 650 mg Rectal Given      01/29/2020 0116 cefepime (MAXIPIME) 2 g/50 mL dextrose IVPB 0 mg Intravenous Stopped      01/29/2020 0046 cefepime (MAXIPIME) 2 g/50 mL dextrose IVPB 2,000 mg Intravenous New Bag      01/29/2020 0105 fentanyl citrate (PF) 100 MCG/2ML 50 mcg 50 mcg Intravenous Given      01/29/2020 0245 fentaNYL 1000 mcg in sodium chloride 0 9% 100mL infusion 50 mcg/hr Intravenous Rate/Dose Verify      01/29/2020 0139 fentaNYL 1000 mcg in sodium chloride 0 9% 100mL infusion 50 mcg/hr Intravenous New Bag      01/29/2020 0225 iodixanol (VISIPAQUE) 320 MG/ML injection 85 mL 85 mL Intravenous Given         Present on Admission:   Community acquired pneumonia   Interstitial lung disease ([facility] [address] [facility] )   Acute and chronic [facility]   CKD (chronic kidney disease), stage II   Diabetes mellitus type 2 in nonobese ([facility])   CHF (congestive heart failure) ([facility])   Septic shock ([facility])      Admitting Diagnosis: Shortness of breath [R06 02]  Respiratory failure ([facility]) [J96 90]  Severe sepsis ([facility] ) [A41 9, R65 20]  Age/Sex: 76 y o  male  Admission Orders:  Scheduled Medications:    Medications:  cefTRIAXone 1,000 mg Intravenous Q24H   And      azithromycin 500 mg Intravenous Q24H   chlorhexidine 15 mL [employer]it Q12H [address]   insulin lispro 1-5 Units Subcutaneous Q6H [address]   ipratropium-albuterol 3 mL Nebulization Q6H     Continuous IV Infusions:    fentaNYL 50 mcg/hr Intravenous Continuous   propofol 5-50 mcg/kg/min Intravenous Titrated     PRN Meds:    levalbuterol 1 25 mg Nebulization Q8H PRN       IP CONSULT TO CASE MANAGEMENT     Aspiration precautions  Tube feed  Q 6 hrs  [facility]    Network Utilization Review Department  [email or web address]  ATTENTION: Please call with any questions or concerns to [phone] and carefully listen to the prompts so that you are directed to the right person  All voicemails are confidential   Clarisa Ventura all requests for admission clinical reviews, approved or denied determinations and any other requests to dedicated fax number below belonging to the campus where the patient is receiving treatment   List of dedicated fax numbers for the Facilities:  [address] [place] [address] DENIALS (Administrative/Medical Necessity) [phone]   [address] (Maternity/NICU/Pediatrics) [phone]     Leo Silverman [phone]   Anni Caller [phone]   Mary Erickson [phone]   Samia Ricciis [address] [facility] [phone]   Emeterio Santos [phone]   [address] [facility]   [address] [phone]

## 2020-01-29 NOTE — H&P
H&P- [address] 1945, 76 y o  male MRN: [id number]    Unit/Bed#: ICU 06 Encounter: [id number]    Primary Care Provider: Crys Yanez MD   Date and time admitted to hospital: 1/28/2020 11:02 PM        Acute and chronic respiratory failure with hypoxia [facility])  Assessment & Plan  Patient reportedly found by EMS with agonal respirations, placed on Bipap enroute to hospital, on arrival to ED with tachypnea, dyspnea so he was urgently intubated  B/L airspace opacities in the setting of ILD noted on CT scan of chest  Antibiotics as above  Aggressive pulmonary toileting  VAP bundle  Duonebs Q6 hours ATC and xopenex prn    * Septic shock [facility])  Assessment & Plan  Patient will be admitted to ICU for further work up and care requiring greater than 2 midnight stay    Likely secondary to multilobar pneumonia  Patient has a history of MSSA bacteremia in 2019 with unknown etiology  With lactic acidosis of 7, reduced to 2 following treatment  Patient blood pressure dropped after sedation initiated, now stabilized without pressors  Given 3L IVF in ED  Treated with cefepime  Blood cultures pending  Will attempt to obtain sputum culture  CT scan of chest with significant disease, R>L      Elevated troponin level not due myocardial infarction  Assessment & Plan  Likely due to demand ischemia  Will trend troponins with EKG    Interstitial lung disease ([facility] )  Assessment & Plan  On 4LNC home oxygen around the clock  On cellcept and prednisone as outpatient  Follows with [facility] pulmonary, last seen 10/2019; has about 2 admissions/year    Community acquired pneumonia  Assessment & Plan  Will treat with ceftriaxone and azithromycin  Check urine antigens  Rapid flu/ RSV negative      Thrombocythemia ([facility] )  Assessment & Plan  On plaquenil and hydrea as an outpatient  Follows with hematology    History of DVT (deep vein thrombosis)  Assessment & Plan     1/72020 INR-2 0  INR on admission 2 89  Continue coumadin daily        CHF (congestive heart failure) (HCC)  Assessment & Plan  Wt Readings from Last 3 Encounters:   01/28/20 78 3 kg (172 lb 9 9 oz)   09/15/16 83 2 kg (183 lb 8 oz)   03/17/16 82 7 kg (182 lb 6 1 oz)   Patient appears hypovolemic at this point  Given 3L IVF in Ed    6/2019 Echo with EF-65%  Monitor fluid status  Record I/Os          Essential hypertension  Assessment & Plan  Hold losartan  Patient had a BP drop in ED following sedation  Will monitor hemodynamics closely    Diabetes mellitus type 2 in nonobese [facility])  Assessment & Plan  No results found for: HGBA1C    No results for input(s): POCGLU in the last 72 hours  Blood Sugar Average: Last 72 hrs:    11/2019 Hb A1C-7 6  Accuchecks with SSI coverage Q6 hours while NPO  Hold outpatient meds    CKD (chronic kidney disease), stage II  Assessment & Plan  Monitor renal indices, urine output  Avoid nephrotoxins    -------------------------------------------------------------------------------------------------------------  Chief Complaint: agonal respiratins    History of Present Illness   HX and PE limited by: patient is intubated, patient family is unavailable  Jennifer Bermudez is a 76 y o  male who was found by EMS to have agonal respirations at his home, he was placed on CPAP en route to the hospital  Upon arrival to the ED he was found to be tachypneic with labored breathing and was urgently intubated  Patient was a history of interstitial lung disease on home oxygen, DVT on coumadin, antiphospholipid antibody positive, CKD, stage 2, DM2, CHF with EF65%, thrombocytemia  On CT scan of chest patient has a notable b/l multilobar pneumonia  His oxygen saturations improved with intubation and continue to be 100% on FiO2 of 50%  Patient's wife developed chest pain while visiting patient in the ED and became admitted, patient's daughter was unavailable to speak with me due to her mother's illness at time of patient admission to ICU     History obtained from chart review  -------------------------------------------------------------------------------------------------------------  Dispo: Admit to Critical Care     Code Status: Level 1 - Full Code  --------------------------------------------------------------------------------------------------------------  Review of Systems   Unable to perform ROS: Acuity of condition     Review of systems was unable to be performed secondary to patient acuity and family unavailable    Physical Exam   Constitutional:   Intubated, sedated  Ashen, poor health   HENT:   Head: Normocephalic and atraumatic  Right Ear: External ear normal    Left Ear: External ear normal    Nose: Nose normal    Mouth/Throat: Oropharynx is clear and moist    Eyes: Conjunctivae and EOM are normal    Left pupil 4, irregular, NR  Right pupil 3 sluggish   Neck: Normal range of motion  Neck supple  No JVD present  No tracheal deviation present  No thyromegaly present  Cardiovascular: Regular rhythm, normal heart sounds and intact distal pulses  Exam reveals no gallop and no friction rub  No murmur heard  tachycardic   Pulmonary/Chest:   Lung sounds with coarse rhonchi throughout  Sputum blood tinged, moderate amounts   Abdominal: Soft  Bowel sounds are normal    Musculoskeletal: Normal range of motion  He exhibits no edema, tenderness or deformity  Lymphadenopathy:     He has no cervical adenopathy  Neurological: He is alert  Awake, follows commands  Unable to fully assess due to sedation   Skin: Skin is warm and dry  Capillary refill takes 2 to 3 seconds  Psychiatric:   cooperative   Nursing note and vitals reviewed      --------------------------------------------------------------------------------------------------------------  Vitals:   Vitals:    01/29/20 0115 01/29/20 0130 01/29/20 0145 01/29/20 0300   BP: 119/56 134/61 138/64 117/52   Pulse: (!) 108 (!) 126 (!) 125 (!) 114   Resp: 18 18 18    Temp: (!) 101 5 °F (38 6 °C) (!) 101 1 °F (38 4 °C) (!) 101 5 °F (38 6 °C) (!) 101 5 °F (38 6 °C)   SpO2: 100% 100% 100% 99%   Weight:         Temp  Min: 101 1 °F (38 4 °C)  Max: 103 3 °F (39 6 °C)  IBW: -88 kg     Body mass index is 31 57 kg/m²  Laboratory and Diagnostics:  Results from last 7 days   Lab Units 01/28/20  2336   WBC Thousand/uL 9 69   HEMOGLOBIN g/dL 8 8*   HEMATOCRIT % 27 3*   PLATELETS Thousands/uL 496*   NEUTROS PCT % 79*   MONOS PCT % 6     Results from last 7 days   Lab Units 01/29/20  0008   SODIUM mmol/L 139   POTASSIUM mmol/L 3 3*   CHLORIDE mmol/L 104   CO2 mmol/L 21   ANION GAP mmol/L 14*   BUN mg/dL 16   CREATININE mg/dL 1 23   CALCIUM mg/dL 8 1*   GLUCOSE RANDOM mg/dL 124   ALT U/L 18   AST U/L 24   ALK PHOS U/L 74   ALBUMIN g/dL 3 1*   TOTAL BILIRUBIN mg/dL 0 29          Results from last 7 days   Lab Units 01/29/20  0008   INR  2 89*   PTT seconds 60*      Results from last 7 days   Lab Units 01/29/20  0008   TROPONIN I ng/mL 1 28*     Results from last 7 days   Lab Units 01/29/20  0135 01/28/20  2334   LACTIC ACID mmol/L 2 2* 7 3*     ABG:  Results from last 7 days   Lab Units 01/29/20  0313   PH ART  7 377   PCO2 ART mm Hg 33 0*   PO2 ART mm Hg 90 3   HCO3 ART mmol/L 19 0*   BASE EXC ART mmol/L -5 7   ABG SOURCE  Radial, Right     VBG:  Results from last 7 days   Lab Units 01/29/20  0313   ABG SOURCE  Radial, Right           Micro:        EKG: Sinus tachycardia, QTc-427  Imaging: I have personally reviewed pertinent reports  Historical Information   Past Medical History:   Diagnosis Date    [facility]     Diabetes mellitus [facility]     Disease of thyroid gland     Hypertension     Inter[facility] lung disease [facility]     Renal disorder      History reviewed  No pertinent surgical history    Social History   Social History     Substance and Sexual Activity   Alcohol Use Not Currently     Social History     Substance and Sexual Activity   Drug Use Not on file     Social History     Tobacco Use Smoking Status Never Smoker   Smokeless Tobacco Never Used     Exercise History: n/a  Family History:   Family History   Problem Relation Age of Onset    Heart disease Mother     Hypertension Mother     Seizures Mother     Hypertension Father     Cancer Sister     Heart disease Brother     Diabetes Brother            Medications:  Current Facility-Administered Medications   Medication Dose Route Frequency    cefTRIAXone (ROCEPHIN) 1,000 mg in dextrose 5 % 50 mL IVPB  1,000 mg Intravenous Q24H    And    azithromycin (ZITHROMAX) 500 mg in sodium chloride 0 9 % 250 mL IVPB  500 mg Intravenous Q24H    chlorhexidine (PERIDEX) 0 12 % oral rinse 15 mL  15 mL Swish & Spit Q12H [address]    fentaNYL 1000 mcg in sodium chloride 0 9% 100mL infusion  50 mcg/hr Intravenous Continuous    insulin lispro (HumaLOG) 100 units/mL subcutaneous injection 1-5 Units  1-5 Units Subcutaneous Q6H [address]    ipratropium-albuterol (DUO-NEB) 0 5-2 5 mg/3 mL inhalation solution 3 mL  3 mL Nebulization Q6H    lactated ringers infusion  50 mL/hr Intravenous Continuous    levalbuterol (XOPENEX) inhalation solution 1 25 mg  1 25 mg Nebulization Q8H PRN    propofol (DIPRIVAN) 1000 mg in 100 mL infusion (premix)  5-50 mcg/kg/min Intravenous Titrated     Home medications:  None     Allergies:  Not on File    ------------------------------------------------------------------------------------------------------------  Advance Directive and Living Will:      Power of :    POLST:    ------------------------------------------------------------------------------------------------------------  Anticipated Length of Stay is > 2 midnights    Counseling / Coordination of Care  Total Critical Care time spent 45 minutes excluding procedures, teaching and family updates  FELICIANO Roberts        Portions of the record may have been created with voice recognition software    Occasional wrong word or "sound a like" substitutions may have occurred due to the inherent limitations of voice recognition software    Read the chart carefully and recognize, using context, where substitutions have occurred

## 2020-01-29 NOTE — ED PROVIDER NOTES
History  Chief Complaint   Patient presents with    Respiratory Distress     per ems, pt found on floor agonally breathing, O2 sat 50% on arrival  pt has hx  pulmonary fibrosis  49-year-old male brought in by EMS for respiratory distress  Patient has a history of pulmonary fibrosis and is on supplemental oxygen continuously at home  EMS states that he was found on the floor and reported to be agonally breathing  O2 saturation on EMS arrival is 50%, patient was placed on CPAP for travel  Aroused emergency department patient is ashen in color and tachypneic, he is tachycardic to the 140s to 150s and has an oxygen saturation in the 60s  Patient has his eyes open and occasionally nods his head  Patient was intubated for respiratory distress  Patient's family member states that he had episode of bronchitis around Michael time, they believed that he was treated with antibiotics in beginning of January  Daughter is with him states that he wears 4 L of oxygen continuously throughout the day, 1 daughter saw him earlier today and states that he was acting like his normal self  Patient is on Coumadin, daughters believe it is for a lower extremity DVT, patient has had difficulty reaching and maintaining therapeutic levels  None       No past medical history on file  No past surgical history on file  Family History   Family history unknown: Yes     I have reviewed and agree with the history as documented      Social History     Tobacco Use    Smoking status: Not on file   Substance Use Topics    Alcohol use: Not on file    Drug use: Not on file        Review of Systems   Unable to perform ROS: Acuity of condition       Physical Exam  ED Triage Vitals   Temperature Pulse Respirations Blood Pressure SpO2   01/29/20 0015 01/28/20 2311 01/28/20 2311 01/28/20 2318 01/28/20 2311   (!) 103 3 °F (39 6 °C) (!) 153 (!) 62 (!) 193/84 (!) 57 %      Temp src Heart Rate Source Patient Position - Orthostatic VS BP Location FiO2 (%)   -- 01/29/20 0015 -- -- --    Monitor         Pain Score       01/28/20 2339       No Pain             Orthostatic Vital Signs  Vitals:    01/29/20 0100 01/29/20 0115 01/29/20 0130 01/29/20 0145   BP: 112/55 119/56 134/61 138/64   Pulse: (!) 106 (!) 108 (!) 126 (!) 125       Physical Exam   Constitutional: He appears well-developed and well-nourished  He appears distressed  HENT:   Head: Normocephalic and atraumatic  Nose: Nose normal    Eyes: Conjunctivae are normal  Right eye exhibits no discharge  Left eye exhibits no discharge  Cardiovascular: Regular rhythm  Tachycardic   Pulmonary/Chest: Accessory muscle usage present  Tachypnea noted  He is in respiratory distress  Tachypnic, using accessory muscles   Abdominal: Soft  He exhibits no distension  Musculoskeletal: He exhibits no edema or deformity  Neurological: He is alert  He exhibits normal muscle tone  Skin: Skin is warm  No rash noted  Ashen color   Vitals reviewed        ED Medications  Medications   propofol (DIPRIVAN) 1000 mg in 100 mL infusion (premix) (20 mcg/kg/min × 78 3 kg Intravenous Rate/Dose Change 1/29/20 0142)   fentaNYL 1000 mcg in sodium chloride 0 9% 100mL infusion (50 mcg/hr Intravenous New Bag 1/29/20 0139)   sodium chloride 0 9 % bolus 1,000 mL (0 mL Intravenous Stopped 1/29/20 0035)   fentanyl citrate (PF) 100 MCG/2ML 100 mcg (100 mcg Intravenous Given 1/28/20 2339)   etomidate (AMIDATE) 2 mg/mL injection 20 mg (20 mg Intravenous Given 1/28/20 2312)   Succinylcholine Chloride 100 mg/5 mL syringe 100 mg (100 mg Intravenous Given 1/28/20 2313)   fentanyl citrate (PF) 100 MCG/2ML 100 mcg (100 mcg Intravenous Given 1/29/20 0026)   sodium chloride 0 9 % bolus 1,000 mL (1,000 mL Intravenous New Bag 1/29/20 0046)   sodium chloride 0 9 % bolus 1,000 mL (0 mL Intravenous Stopped 1/29/20 0132)   acetaminophen (TYLENOL) rectal suppository 650 mg (650 mg Rectal Given 1/29/20 0036)   cefepime (MAXIPIME) 2 g/50 mL dextrose IVPB (0 mg Intravenous Stopped 1/29/20 0116)   fentanyl citrate (PF) 100 MCG/2ML 50 mcg (50 mcg Intravenous Given 1/29/20 0105)       Diagnostic Studies  Results Reviewed     Procedure Component Value Units Date/Time    Urine Microscopic [[id number]]  (Abnormal) Collected:  01/28/20 2343    Lab Status:  Final result Specimen:  Urine, Clean Catch Updated:  01/29/20 0138     RBC, UA 1-2 /hpf      WBC, UA 2-4 /hpf      Epithelial Cells Occasional /hpf      Bacteria, UA Occasional /hpf     Lactic acid, plasma x2 [[id number]] Collected:  01/29/20 0135    Lab Status:  No result Specimen:  Blood from Arm, Left     Influenza A/B and RSV PCR [[id number]]  (Normal) Collected:  01/28/20 2338    Lab Status:  Final result Specimen:  Nasopharyngeal Swab Updated:  01/29/20 0132     INFLUENZA A PCR None Detected     INFLUENZA B PCR None Detected     RSV PCR None Detected    Protime-INR [[id number]]  (Abnormal) Collected:  01/29/20 0008    Lab Status:  Final result Specimen:  Blood from Arm, Left Updated:  01/29/20 0104     Protime 30 9 seconds      INR 2 89    APTT [[id number]]  (Abnormal) Collected:  01/29/20 0008    Lab Status:  Final result Specimen:  Blood from Arm, Left Updated:  01/29/20 0104     PTT 60 seconds     Troponin I [[id number]]  (Abnormal) Collected:  01/29/20 0008    Lab Status:  Final result Specimen:  Blood from Arm, Left Updated:  01/29/20 0103     Troponin I 1 28 ng/mL     NT-BNP PRO [[id number]]  (Abnormal) Collected:  01/29/20 0008    Lab Status:  Final result Specimen:  Blood from Arm, Left Updated:  01/29/20 0058     NT-proBNP 299 pg/mL     Procalcitonin [[id number]] Collected:  01/29/20 0045    Lab Status:   In process Specimen:  Blood from Arm, Right Updated:  01/29/20 0048    Comprehensive metabolic panel [[id number]]  (Abnormal) Collected:  01/29/20 0008    Lab Status:  Final result Specimen:  Blood from Arm, Left Updated:  01/29/20 0047     Sodium 139 mmol/L      Potassium 3 3 mmol/L Chloride 104 mmol/L      CO2 21 mmol/L      ANION GAP 14 mmol/L      BUN 16 mg/dL      Creatinine 1 23 mg/dL      Glucose 124 mg/dL      Calcium 8 1 mg/dL      AST 24 U/L      ALT 18 U/L      Alkaline Phosphatase 74 U/L      Total Protein 7 0 g/dL      Albumin 3 1 g/dL      Total Bilirubin 0 29 mg/dL      eGFR 57 ml/min/1 73sq m     Narrative:       [place] guidelines for Chronic Kidney Disease (CKD):     Stage 1 with normal or high GFR (GFR > 90 mL/min/1 73 square meters)    Stage 2 Mild CKD (GFR = 60-89 mL/min/1 73 square meters)    Stage 3A Moderate CKD (GFR = 45-59 mL/min/1 73 square meters)    Stage 3B Moderate CKD (GFR = 30-44 mL/min/1 73 square meters)    Stage 4 Severe CKD (GFR = 15-29 mL/min/1 73 square meters)    Stage 5 End Stage CKD (GFR <15 mL/min/1 73 square meters)  Note: GFR calculation is accurate only with a steady state creatinine    Lactic acid, plasma x2 [[id number]]  (Abnormal) Collected:  01/28/20 2334    Lab Status:  Final result Specimen:  Blood from Arm, Right Updated:  01/29/20 0040     LACTIC ACID 7 3 mmol/L     Narrative:       Result may be elevated if tourniquet was used during collection      Ammonia [[id number]]  (Normal) Collected:  01/28/20 2334    Lab Status:  Final result Specimen:  Blood from Arm, Right Updated:  01/29/20 0029     Ammonia 18 umol/L     POCT urinalysis dipstick [[id number]]  (Normal) Resulted:  01/29/20 0008    Lab Status:  Final result Specimen:  Urine Updated:  01/29/20 0008    Blood gas, arterial [[id number]]  (Abnormal) Collected:  01/28/20 2349    Lab Status:  Final result Specimen:  Blood, Arterial from Radial, Left Updated:  01/29/20 0004     pH, Arterial 7 472     pCO2, Arterial 26 0 mm Hg      pO2, Arterial 216 1 mm Hg      HCO3, Arterial 18 6 mmol/L      Base Excess, Arterial -4 3 mmol/L      O2 Content, Arterial 11 5 mL/dL      O2 HGB,Arterial  98 1 %      SOURCE Radial, Left     EVRETON TEST Yes     Vent Type- AC AC     AC Rate 16     Tidal Volume 450 ml      Inspired Air (FIO2) 100     PEEP 5    CBC and differential [[id number]]  (Abnormal) Collected:  01/28/20 2336    Lab Status:  Final result Specimen:  Blood from Arm, Right Updated:  01/29/20 0001     WBC 9 69 Thousand/uL      RBC 2 03 Million/uL      Hemoglobin 8 8 g/dL      Hematocrit 27 3 %       fL      MCH 43 3 pg      MCHC 32 2 g/dL      RDW 16 9 %      MPV 10 0 fL      Platelets 083 Thousands/uL      nRBC 0 /100 WBCs      Neutrophils Relative 79 %      Immat GRANS % 1 %      Lymphocytes Relative 10 %      Monocytes Relative 6 %      Eosinophils Relative 3 %      Basophils Relative 1 %      Neutrophils Absolute 7 67 Thousands/µL      Immature Grans Absolute 0 12 Thousand/uL      Lymphocytes Absolute 0 98 Thousands/µL      Monocytes Absolute 0 60 Thousand/µL      Eosinophils Absolute 0 26 Thousand/µL      Basophils Absolute 0 06 Thousands/µL     Blood culture #1 [[id number]] Collected:  01/28/20 2336    Lab Status: In process Specimen:  Blood from Arm, Left Updated:  01/28/20 2353    Blood culture #2 [[id number]] Collected:  01/28/20 2336    Lab Status:   In process Specimen:  Blood from Arm, Right Updated:  01/28/20 2353    Urine Macroscopic, POC [[id number]]  (Abnormal) Collected:  01/28/20 2343    Lab Status:  Final result Specimen:  Urine Updated:  01/28/20 2344     Color, UA Yellow     Clarity, UA Clear     pH, UA 6 0     Leukocytes, UA Negative     Nitrite, UA Negative     Protein, UA 30 (1+) mg/dl      Glucose, UA Negative mg/dl      Ketones, UA Negative mg/dl      Urobilinogen, UA 0 2 E U /dl      Bilirubin, UA Negative     Blood, UA Trace     Specific Flasher, UA 1 020    Narrative:       CLINITEK RESULT                 CTA ED chest PE study    (Results Pending)   XR chest 1 view portable    (Results Pending)         Procedures  Intubation  Date/Time: 1/29/2020 1:15 AM  Performed by: Rm Siu DO  Authorized by: Rm Siu DO     Patient location: ED  Other Assisting Provider: Yes (comment) (Dr Skyler Sams)    Consent:     Consent obtained:  Emergent situation  Universal protocol:     Patient identity confirmed:  Arm band  Pre-procedure details:     Patient status:  Awake    Pretreatment medications:  Etomidate    Paralytics:  Succinylcholine  Indications:     Indications for intubation: respiratory distress    Procedure details:     Preoxygenation:  Bag valve mask (CPAP en route)    CPR in progress: no      Intubation method:  Oral    Oral intubation technique:  Direct    Laryngoscope blade: Mac 4    Tube size (mm):  8 0    Tube type:  Cuffed    Number of attempts:  1    Ventilation between attempts: no      Cricoid pressure: no      Tube visualized through cords: yes    Placement assessment:     ETT to lip:  22    Tube secured with:  ETT gilmore    Breath sounds:  Equal    Placement verification: CXR verification, direct visualization and ETCO2 detector      CXR findings:  ETT in proper place  Post-procedure details:     Patient tolerance of procedure:   Tolerated well, no immediate complications  ECG 12 Lead Documentation Only  Date/Time: 1/29/2020 1:17 AM  Performed by: Irene Chou DO  Authorized by: Irene Chou DO     Indications / Diagnosis:  Repsiratory distress  ECG reviewed by me, the ED Provider: yes    Patient location:  ED  Previous ECG:     Previous ECG:  Compared to current    Comparison ECG info:  3/5/1993    Similarity:  Changes noted  Interpretation:     Interpretation: non-specific    Rate:     ECG rate:  134    ECG rate assessment: tachycardic    Rhythm:     Rhythm: sinus rhythm    Ectopy:     Ectopy: none    QRS:     QRS axis:  Normal    QRS intervals:  Normal  Conduction:     Conduction: normal    ST segments:     ST segments:  Non-specific    Elevation:  V4 and V5  T waves:     T waves: inverted      Inverted:  III          ED Course  ED Course as of Jan 29 0154 Wed Jan 29, 2020   0057 LACTIC ACID(!!): 7 3   0059 WBC: 9 69   0059 Hemoglobin(!): 8 8                   Initial Sepsis Screening     Row Name 01/29/20 0101                Is the patient's history suggestive of a new or worsening infection? (!) Yes (Proceed)  -        Suspected source of infection  pneumonia  -MC        Are two or more of the following signs & symptoms of infection both present and new to the patient? (!) Yes (Proceed)  -MC        Indicate SIRS criteria  Hyperthemia > 38 3C (100 9F); Tachypnea > 20 resp per min  -        If the answer is yes to both questions, suspicion of sepsis is present          If severe sepsis is present AND tissue hypoperfusion perists in the hour after fluid resuscitation or lactate > 4, the patient meets criteria for SEPTIC SHOCK          Are any of the following organ dysfunction criteria present within 6 hours of suspected infection and SIRS criteria that are NOT considered to be chronic conditions? (!) Yes  -MC        Organ dysfunction  Lactate >/equal 4 0 mmol/L  -        Date of presentation of severe sepsis  01/29/20  -        Time of presentation of severe sepsis  0102  -        Tissue hypoperfusion persists in the hour after crystalloid fluid administration, evidenced, by either:          Was hypotension present within one hour of the conclusion of crystalloid fluid administration?           Date of presentation of septic shock          Time of presentation of septic shock            User Key  (r) = Recorded By, (t) = Taken By, (c) = Cosigned By    [address] Name Provider Type    [address], DO Resident           Default Flowsheet Data (last 720 hours)      Sepsis [facility] Name 01/29/20 0152                   Repeat Volume Status and Tissue Perfusion Assessment Performed    Repeat Volume Status and Tissue Perfusion Assessment Performed  Yes  -MC           Volume Status and Tissue Perfusion Post Fluid Resuscitation * Must Document All *    Vital Signs Reviewed (HR, RR, BP, T)  Yes  -MC Shock Index Reviewed  Yes  -MC        Arterial Oxygen Saturation Reviewed (POx, SaO2 or SpO2)  Yes (comment %) [id number]%  -MC        Cardio  (!) Tachycardia  -MC        Pulmonary  Other (comment) Intubated, no wheezes  -MC        Capillary Refill          Peripheral Pulses          Peripheral Pulse          Skin  Warm  -MC        Urine output assessed  Adequate  -MC           *OR*   Intensive Monitoring- Must Document One of the Following Four *:    Vital Signs Reviewed          * Central Venous Pressure (CVP or RAP)          * Central Venous Oxygen (SVO2, ScvO2 or Oxygen saturation via central catheter)          * Bedside Cardiovascular US in IVC diameter and % collapse          * Passive Leg Raise OR Crystalloid Challenge            User Key  (r) = Recorded By, (t) = Taken By, (c) = Cosigned By    Initials Name Provider Type    [address], DO Resident          Yury Lipoma' Criteria for PE      Most Recent Value   Wells' Criteria for PE   Clinical signs and symptoms of DVT  0 Filed at: 01/28/2020 2342   PE is primary diagnosis or equally likely  3 Filed at: 01/28/2020 2342   HR >100  1 5 Filed at: 01/28/2020 2342   Immobilization at least 3 days or Surgery in the previous 4 weeks  0 Filed at: 01/28/2020 2342   Previous, objectively diagnosed PE or DVT  0 Filed at: 01/28/2020 2342   Hemoptysis  0 Filed at: 01/28/2020 2342   Malignancy with treatment within 6 months or palliative  0 Filed at: 01/28/2020 2342   Wells' Criteria Total  4 5 Filed at: 01/28/2020 2342            MDM  Number of Diagnoses or Management Options  Respiratory failure [facility]):   Severe sepsis [facility]):   Diagnosis management comments: Patient admitted to ICU for respiratory distress, likely pneumonia as source at this point           Disposition  Final diagnoses:   Respiratory failure ([address] )   Severe sepsis ([facility] [address] )     Time reflects when diagnosis was documented in both MDM as applicable and the Disposition within this note     Time User Action Codes Description Comment    1/29/2020  1:28 AM Fay CARRASCO Add [J96 90] Respiratory failure ([address] )     1/29/2020  1:28 AM Fay CARRASCO Add [A41 9,  R65 20] Severe sepsis [facility])       ED Disposition     ED Disposition Condition Date/Time Comment    Admit Stable Wed Jan 29, 2020  1:28 AM Case was discussed with Critical Care and the patient's admission status was agreed to be Admission Status: inpatient status to the service of Dr Marylen Loh   Follow-up Information    None         Patient's Medications    No medications on file     No discharge procedures on file  ED Provider  Attending physically available and evaluated Vivianaky Jones  JACOB managed the patient along with the ED Attending      Electronically Signed by         Rm Siu DO  01/29/20 6822

## 2020-01-29 NOTE — ED PROVIDER NOTES
Pt Name: Kayden Henley  MRN: [id number]  [place] 1945  Age/Sex: 76 y o  male  Date of evaluation: 1/28/2020  PCP: Alexey Rust MD    [address] [place] [address]    Chief Complaint   Patient presents with    Respiratory Distress     per ems, pt found on floor agonally breathing, O2 sat 50% on arrival  pt has hx  pulmonary fibrosis  HPI    Per EMS patient had agonal respirations and had pulse ox in the 50s  He was placed on CPAP and improved to 70s but still in significant respiratory distress  According to family he had recently been subtherapeutic on coumadin and had also been having nosebleeds from his nasal cannula  HPI      Past Medical and Surgical History    No past medical history on file  No past surgical history on file  Family History   Family history unknown: Yes       Social History     Tobacco Use    Smoking status: Not on file   Substance Use Topics    Alcohol use: Not on file    Drug use: Not on file           Allergies    Not on File    Home Medications    Prior to Admission medications    Not on File           Review of Systems    Review of Systems   Unable to perform ROS: Severe respiratory distress         Physical Exam      ED Triage Vitals   Temperature Pulse Respirations Blood Pressure SpO2   01/29/20 0015 01/28/20 2311 01/28/20 2311 01/28/20 2318 01/28/20 2311   (!) 103 3 °F (39 6 °C) (!) 153 (!) 62 (!) 193/84 (!) 57 %      Temp src Heart Rate Source Patient Position - Orthostatic VS BP Location FiO2 (%)   -- 01/29/20 0015 -- -- --    Monitor         Pain Score       01/28/20 2339       No Pain               Physical Exam   Constitutional: He is oriented to person, place, and time  He has a sickly appearance  He appears ill  He appears distressed  HENT:   Head: Normocephalic and atraumatic  Nose: Nose normal    Mouth/Throat: Oropharynx is clear and moist    Eyes: Pupils are equal, round, and reactive to light   Conjunctivae and EOM are normal  Neck: Normal range of motion  Neck supple  Cardiovascular: Normal rate, regular rhythm and normal heart sounds  Exam reveals no gallop and no friction rub  No murmur heard  Pulmonary/Chest: Accessory muscle usage present  Tachypnea noted  He is in respiratory distress  He has wheezes  He has rhonchi  He has no rales  Abdominal: Soft  Bowel sounds are normal  There is no tenderness  There is no rebound and no guarding  Musculoskeletal: Normal range of motion  Neurological: He is alert and oriented to person, place, and time  Skin: Skin is warm  He is diaphoretic  There is cyanosis  There is pallor  Psychiatric: He has a normal mood and affect  His behavior is normal    Nursing note and vitals reviewed  Assessment and Plan    Isabelle Davila is a 76 y o  male who presents with respiratory failure  Differential diagnosis (not completely inclusive) includes infection/ PE/ other  Plan will be to perform diagnostic testing and treat symptomatically  MDM    Diagnostic Results    EKG:  sinus tachycardia, ischemic changes noted in multiple leads may be rate related and are new from prior EKG    EKG INTERPRETATION  EKG Interpretation    EKG interpreted by me  Interpretation by Imer Ascencio DO  EKG reviewed and interpreted independently      Labs:    Results for orders placed or performed during the hospital encounter of 01/28/20   CBC and differential   Result Value Ref Range    WBC 9 69 4 31 - 10 16 Thousand/uL    RBC 2 03 (L) 3 88 - 5 62 Million/uL    Hemoglobin 8 8 (L) 12 0 - 17 0 g/dL    Hematocrit 27 3 (L) 36 5 - 49 3 %     (H) 82 - 98 fL    MCH 43 3 (H) 26 8 - 34 3 pg    MCHC 32 2 31 4 - 37 4 g/dL    RDW 16 9 (H) 11 6 - 15 1 %    MPV 10 0 8 9 - 12 7 fL    Platelets 974 (H) 136 - 390 Thousands/uL    nRBC 0 /100 WBCs    Neutrophils Relative 79 (H) 43 - 75 %    Immat GRANS % 1 0 - 2 %    Lymphocytes Relative 10 (L) 14 - 44 %    Monocytes Relative 6 4 - 12 % Eosinophils Relative 3 0 - 6 %    Basophils Relative 1 0 - 1 %    Neutrophils Absolute 7 67 (H) 1 85 - 7 62 Thousands/µL    Immature Grans Absolute 0 12 0 00 - 0 20 Thousand/uL    Lymphocytes Absolute 0 98 0 60 - 4 47 Thousands/µL    Monocytes Absolute 0 60 0 17 - 1 22 Thousand/µL    Eosinophils Absolute 0 26 0 00 - 0 61 Thousand/µL    Basophils Absolute 0 06 0 00 - 0 10 Thousands/µL   Protime-INR   Result Value Ref Range    Protime 30 9 (H) 11 6 - 14 5 seconds    INR 2 89 (H) 0 84 - 1 19   APTT   Result Value Ref Range    PTT 60 (H) 23 - 37 seconds   Comprehensive metabolic panel   Result Value Ref Range    Sodium 139 136 - 145 mmol/L    Potassium 3 3 (L) 3 5 - 5 3 mmol/L    Chloride 104 100 - 108 mmol/L    CO2 21 21 - 32 mmol/L    ANION GAP 14 (H) 4 - 13 mmol/L    BUN 16 5 - 25 mg/dL    Creatinine 1 23 0 60 - 1 30 mg/dL    Glucose 124 65 - 140 mg/dL    Calcium 8 1 (L) 8 3 - 10 1 mg/dL    AST 24 5 - 45 U/L    ALT 18 12 - 78 U/L    Alkaline Phosphatase 74 46 - 116 U/L    Total Protein 7 0 6 4 - 8 2 g/dL    Albumin 3 1 (L) 3 5 - 5 0 g/dL    Total Bilirubin 0 29 0 20 - 1 00 mg/dL    eGFR 57 ml/min/1 73sq m   Ammonia   Result Value Ref Range    Ammonia 18 11 - 35 umol/L   Troponin I   Result Value Ref Range    Troponin I 1 28 (H) <=0 04 ng/mL   NT-BNP PRO   Result Value Ref Range    NT-proBNP 299 (H) <125 pg/mL   Lactic acid, plasma x2   Result Value Ref Range    LACTIC ACID 7 3 (HH) 0 5 - 2 0 mmol/L   Blood gas, arterial   Result Value Ref Range    pH, Arterial 7 472 (H) 7 350 - 7 450    pCO2, Arterial 26 0 (LL) 36 0 - 44 0 mm Hg    pO2, Arterial 216 1 (H) 75 0 - 129 0 mm Hg    HCO3, Arterial 18 6 (L) 22 0 - 28 0 mmol/L    Base Excess, Arterial -4 3 mmol/L    O2 Content, Arterial 11 5 (L) 16 0 - 23 0 mL/dL    O2 HGB,Arterial  98 1 (H) 94 0 - 97 0 %    SOURCE Radial, Left     EVERTON TEST Yes     Vent Type- AC AC     AC Rate 16     Tidal Volume 450 ml    Inspired Air (FIO2) 100     PEEP 5    Urine Macroscopic, POC Result Value Ref Range    Color, UA Yellow     Clarity, UA Clear     pH, UA 6 0 4 5 - 8 0    Leukocytes, UA Negative Negative    Nitrite, UA Negative Negative    Protein, UA 30 (1+) (A) Negative mg/dl    Glucose, UA Negative Negative mg/dl    Ketones, UA Negative Negative mg/dl    Urobilinogen, UA 0 2 0 2, 1 0 E U /dl E U /dl    Bilirubin, UA Negative Negative    Blood, UA Trace (A) Negative    Specific Gravity, UA 1 020 1 003 - 1 030       All labs reviewed and utilized in the medical decision making process    Radiology:    CTA ED chest PE study    (Results Pending)   XR chest 1 view portable    (Results Pending)       All radiology studies independently viewed by me and interpreted by the radiologist     Procedure    CriticalCare Time  Performed by: Armando Matos DO  Authorized by: Armando Matos DO     Critical care provider statement:     Critical care time (minutes):  60    Critical care time was exclusive of:  Separately billable procedures and treating other patients and teaching time    Critical care was time spent personally by me on the following activities:  Blood draw for specimens, obtaining history from patient or surrogate, development of treatment plan with patient or surrogate, discussions with consultants, evaluation of patient's response to treatment, examination of patient, interpretation of cardiac output measurements, ordering and performing treatments and interventions, ordering and review of laboratory studies, ordering and review of radiographic studies, re-evaluation of patient's condition and review of old charts    I assumed direction of critical care for this patient from another provider in my specialty: no            ED Course of Care and Re-Assessments    I was present in the room and supervised intubation by resident on first attempt and without complication  Patient ordered IVF and abx as well as antipyretics and sedation  ABG ordered and reviewed  I discussed patient's status with family at length  Medications   propofol (DIPRIVAN) 1000 mg in 100 mL infusion (premix) (15 mcg/kg/min × 78 3 kg Intravenous Rate/Dose Change 1/29/20 0001)   sodium chloride 0 9 % bolus 1,000 mL (1,000 mL Intravenous New Bag 1/29/20 0046)   sodium chloride 0 9 % bolus 1,000 mL (1,000 mL Intravenous New Bag 1/29/20 0046)   fentaNYL 1000 mcg in sodium chloride 0 9% 100mL infusion (has no administration in time range)   sodium chloride 0 9 % bolus 1,000 mL (0 mL Intravenous Stopped 1/29/20 0035)   fentanyl citrate (PF) 100 MCG/2ML 100 mcg (100 mcg Intravenous Given 1/28/20 2339)   etomidate (AMIDATE) 2 mg/mL injection 20 mg (20 mg Intravenous Given 1/28/20 2312)   Succinylcholine Chloride 100 mg/5 mL syringe 100 mg (100 mg Intravenous Given 1/28/20 2313)   fentanyl citrate (PF) 100 MCG/2ML 100 mcg (100 mcg Intravenous Given 1/29/20 0026)   acetaminophen (TYLENOL) rectal suppository 650 mg (650 mg Rectal Given 1/29/20 0036)   cefepime (MAXIPIME) 2 g/50 mL dextrose IVPB (0 mg Intravenous Stopped 1/29/20 0116)   fentanyl citrate (PF) 100 MCG/2ML 50 mcg (50 mcg Intravenous Given 1/29/20 0105)           FINAL IMPRESSION    Final diagnoses:   Respiratory failure ([facility] )   Severe sepsis ([facility] )         DISPOSITION/PLAN    Time reflects when diagnosis was documented in both MDM as applicable and the Disposition within this note     Time User Action Codes Description Comment    1/29/2020  1:28 AM Rosario CARRASCO Add [J96 90] Respiratory failure ([facility] [address] )     1/29/2020  1:28 AM Rosario Martini [A41 9,  R65 20] Severe sepsis [facility])       ED Disposition     ED Disposition Condition Date/Time Comment    Admit Stable Wed Jan 29, 2020  1:28 AM Case was discussed with Critical Care and the patient's admission status was agreed to be Admission Status: inpatient status to the service of Dr Glynn Hickey           Follow-up Information    None           Jesús Pelletier DO Jaxon Barros, DO  02/03/20 4730

## 2020-01-29 NOTE — ASSESSMENT & PLAN NOTE
On 4LNC home oxygen around the clock  On cellcept and prednisone as outpatient  Follows with [facility] pulmonary, last seen 10/2019; has about 2 admissions/year

## 2020-01-30 PROBLEM — D53.9 MACROCYTIC ANEMIA: Status: ACTIVE | Noted: 2020-01-01

## 2020-01-30 NOTE — PROGRESS NOTES
Vancomycin Assessment    Adan Reyes is a 76 y o  male who is currently receiving vancomycin 1250mg IV every 12 hours for Pneumonia     Relevant clinical data and objective history reviewed:  Creatinine   Date Value Ref Range Status   01/30/2020 1 32 (H) 0 60 - 1 30 mg/dL Final     Comment:     Standardized to IDMS reference method   01/29/2020 1 16 0 60 - 1 30 mg/dL Final     Comment:     Standardized to IDMS reference method   01/29/2020 1 23 0 60 - 1 30 mg/dL Final     Comment:     Standardized to IDMS reference method   03/17/2014 1 24 0 60 - 1 30 mg/dL Final     Comment:     Standardized to IDMS reference method     /60   Pulse 104   Temp 98 6 °F (37 °C)   Resp (!) 24   Wt 78 3 kg (172 lb 9 9 oz)   SpO2 92%   BMI 31 57 kg/m²   I/O last 3 completed shifts: In: 6712 [I V :609; Blood:360; IV Piggyback:2450]  Out: 2990 [Urine:2990]  Lab Results   Component Value Date/Time    BUN 17 01/30/2020 07:48 AM    BUN 19 03/17/2014 11:20 AM    WBC 9 50 01/30/2020 07:48 AM    WBC 8 47 09/17/2015 11:24 AM    HGB 8 6 (L) 01/30/2020 07:48 AM    HGB 11 9 (L) 09/17/2015 11:24 AM    HCT 26 3 (L) 01/30/2020 07:48 AM    HCT 34 8 (L) 09/17/2015 11:24 AM     (H) 01/30/2020 07:48 AM     (H) 09/17/2015 11:24 AM     01/30/2020 07:48 AM     (H) 09/17/2015 11:24 AM     Temp Readings from Last 3 Encounters:   01/30/20 98 6 °F (37 °C)   09/15/16 (!) 96 7 °F (35 9 °C)   03/17/16 98 5 °F (36 9 °C)     Vancomycin Days of Therapy: 1    Assessment/Plan  The patient is currently on vancomycin utilizing scheduled dosing based on adjusted body weight (due to obesity)  Baseline risks associated with therapy include: pre-existing renal impairment, concomitant nephrotoxic medications, advanced age and dehydration  The patient is currently receiving 1250mg IV every 12 hours and after clinical evaluation will be changed to 750mg IV every 12 hours    Pharmacy will also follow closely for s/sx of nephrotoxicity, infusion reactions and appropriateness of therapy  BMP and CBC will be ordered per protocol  Plan for trough as patient approaches steady state, prior to the 4th  dose at approximately [phone] on 1/31/20  Due to infection severity, will target a trough of 15-20 (appropriate for most indications)  Pharmacy will continue to follow the patients culture results and clinical progress daily      Sammie Mendez, Pharmacist

## 2020-01-30 NOTE — ASSESSMENT & PLAN NOTE
Likely secondary to multilobar pneumonia  Patient has a history of MSSA bacteremia in 2019 with unknown etiology  With initial lactic acidosis of 7, reduced to 2 following treatment  Blood cultures in process  Sputum culture in process  Continue ceftriaxone, azithromycin  CT scan of chest with significant disease, R>L

## 2020-01-30 NOTE — ASSESSMENT & PLAN NOTE
On 4LNC home oxygen around the clock  On cellcept and prednisone as outpatient  Follows with [facility], last seen 10/2019

## 2020-01-30 NOTE — PROCEDURES
Intubation  Date/Time: 1/30/2020 12:18 AM  Performed by: FELICIANO Figueroa  Authorized by: FELICIANO Figueroa     Patient location:  Bedside  Consent:     Consent obtained:  Emergent situation  Universal protocol:     Immediately prior to procedure, a time out was called: yes      Patient identity confirmed:  Verbally with patient  Pre-procedure details:     Patient status:  Awake    Mallampati score:  2    Pretreatment medications:  Etomidate  Indications:     Indications for intubation: respiratory distress, respiratory failure and hypoxemia    Procedure details:     Preoxygenation:  Bag valve mask    Intubation method:  Oral    Oral intubation technique:  Direct    Laryngoscope blade: Mac 3    Tube size (mm):  7 5    Tube type:  Cuffed    Number of attempts:  1    Tube visualized through cords: yes    Placement assessment:     ETT to lip:  22 at lip    Tube secured with:  ETT gilmore    Breath sounds:  Equal    Placement verification: chest rise, CXR verification, equal breath sounds and ETCO2 detector      CXR findings:  ETT in proper place  Post-procedure details:     Patient tolerance of procedure: Tolerated well, no immediate complications  Comments:      ETT retracted 3 cm after CXR verification, now 22 at lip

## 2020-01-30 NOTE — ASSESSMENT & PLAN NOTE
No results found for: HGBA1C    Recent Labs     01/29/20  0617 01/29/20  1156 01/29/20  1802   POCGLU 84 109 115       Blood Sugar Average: Last 72 hrs:  (P) 181 [id number]  11/2019 Hb A1C-7 6  Accuchecks with SSI coverage Q6 hours while NPO  Hold outpatient meds

## 2020-01-30 NOTE — PROGRESS NOTES
Progress Note - Kayden Henley 1945, 76 y o  male MRN: [id number]    Unit/Bed#: ICU 06 Encounter: [id number]    Primary Care Provider: Alexey Rust MD   Date and time admitted to hospital: 1/28/2020 11:02 PM        Acute and chronic respiratory failure with hypoxia [facility])  Assessment & Plan  Re-intubated last night for severe hypoxia and respiratory distress  B/L airspace opacities in the setting of ILD noted on CT scan of chest  PCXR following intubation appears worse on right, there also may be a component of fluid overload; lasix 40 mg IV given  Continue ceftriaxone, azithromycin, antibiotic days #2  Aggressive pulmonary toileting  VAP bundle  Duonebs Q6 hours ATC and xopenex prn    * Septic shock ([facility] [address] )  Assessment & Plan  Likely secondary to multilobar pneumonia  Patient has a history of MSSA bacteremia in 2019 with unknown etiology  With initial lactic acidosis of 7, reduced to 2 following treatment  Blood cultures in process  Sputum culture in process  Continue ceftriaxone, azithromycin  CT scan of chest with significant disease, R>L      Elevated troponin level not due myocardial infarction  Assessment & Plan  Likely due to demand ischemia  Troponin spike>3     Will check Echocardiogram    Interstitial lung disease ([facility] [address] )  Assessment & Plan  On 4LNC home oxygen around the clock  On cellcept and prednisone as outpatient  Follows with [facility] pulmonary, last seen 10/2019    Community acquired pneumonia  Assessment & Plan  Continue antibiotics as above  Strep, legionella negative  Rapid flu/ RSV negative      Thrombocythemia ([facility] [address] )  Assessment & Plan  On plaquenil and hydrea as an outpatient  Follows with hematology    History of DVT (deep vein thrombosis)  Assessment & Plan     1/72020 INR-2 0  INR on admission 2 89  Continue coumadin daily        CHF (congestive heart failure) ([facility])  Assessment & Plan  Wt Readings from Last 3 Encounters:   01/28/20 78 3 kg (172 lb 9 9 oz)   09/15/16 83 2 kg (183 lb 8 oz)   16 82 7 kg (182 lb 6 1 oz)   patient has +3L balance since admission, given lasix for hypoxia with significant diuresis    2019 Echo with EF-65%  Will check echo  Monitor fluid status  Record I/Os          Essential hypertension  Assessment & Plan  Hold losartan  Will monitor hemodynamics closely    Diabetes mellitus type 2 in nonobese [facility])  Assessment & Plan  No results found for: HGBA1C    Recent Labs     20  0617 20  1156 20  1802   POCGLU 84 109 115       Blood Sugar Average: Last 72 hrs:  (P) 548 [id number]  2019 Hb A1C-7 6  Accuchecks with SSI coverage Q6 hours while NPO  Hold outpatient meds    CKD (chronic kidney disease), stage II  Assessment & Plan  Monitor renal indices, urine output  Avoid nephrotoxins    Macrocytic anemia  Assessment & Plan  History of hypothyroidism, Anemia of chronic disease  Vitamine B12 level-638, 2019  1unit of PRBCs transfused on 20  Trend hemoglobin, transfuse for hgb <7    ----------------------------------------------------------------------------------------  HPI/24hr events: Patient developed acute respiratory distress with hypoxia requiring intubation  Oxygenation improved after intubation  Disposition: Continue Critical Care   Code Status: Level 1 - Full Code  ---------------------------------------------------------------------------------------  SUBJECTIVE  Patient told his daughter that he was dying       Review of Systems  Review of systems was reviewed and negative unless stated above in HPI/24-hour events   ---------------------------------------------------------------------------------------  OBJECTIVE    Vitals   Vitals:    20 0200 20 0400 20 0500 20 0600   BP: 134/65 110/59 111/54 107/58   Pulse: (!) 106 96 98 94   Resp:       Temp:       TempSrc:       SpO2: 98% 100% 97% 92%   Weight:         Temp (24hrs), Av 2 °F (37 9 °C), Min:98 2 °F (36 8 °C), Max:101 1 °F (38 4 °C)  Current: Temperature: 98 2 °F (36 8 °C)        SpO2: SpO2: 92 %       Physical Exam   Constitutional: He is oriented to person, place, and time  Ashen appearance, minimal communication, primarily [address] speaking, communicates when he is in distress   HENT:   Head: Normocephalic and atraumatic  Right Ear: External ear normal    Left Ear: External ear normal    Nose: Nose normal    Mouth/Throat: Oropharynx is clear and moist    Eyes: Pupils are equal, round, and reactive to light  Conjunctivae and EOM are normal    Neck: Normal range of motion  Neck supple  No JVD present  No tracheal deviation present  No thyromegaly present  Cardiovascular: Regular rhythm, normal heart sounds and intact distal pulses  Sinus tachycardia   Pulmonary/Chest: No stridor  He has no wheezes  Respiratory distress prior to intubation  Lung sounds diminished throughout with coarse upper breath sounds   Abdominal: Soft  Bowel sounds are normal    Musculoskeletal: Normal range of motion  Lymphadenopathy:     He has no cervical adenopathy  Neurological: He is alert and oriented to person, place, and time  Skin: Skin is warm and dry  Capillary refill takes 2 to 3 seconds  Psychiatric: He has a normal mood and affect  His behavior is normal  Judgment and thought content normal    Nursing note and vitals reviewed        Invasive/non-invasive ventilation settings   Respiratory    Lab Data (Last 4 hours)    None         O2/Vent Data (Last 4 hours)      01/30 0400           Vent Mode AC/VC       Resp Rate (BPM) (BPM) 14       Vt (mL) (mL) 380       FIO2 (%) (%) 70       PEEP (cmH2O) (cmH2O) 10       MV 9 6                   Laboratory and Diagnostics:  Results from last 7 days   Lab Units 01/29/20  1232 01/29/20  0638 01/28/20  2336   WBC Thousand/uL  --  9 58 9 69   HEMOGLOBIN g/dL 6 8* 6 8* 8 8*   HEMATOCRIT %  --  21 3* 27 3*   PLATELETS Thousands/uL  --  319 496*   NEUTROS PCT %  --   --  79*   BANDS PCT %  --  6  --    MONOS PCT % --   --  6   MONO PCT %  --  9  --      Results from last 7 days   Lab Units 01/29/20  0638 01/29/20  0008   SODIUM mmol/L 141 139   POTASSIUM mmol/L 3 8 3 3*   CHLORIDE mmol/L 109* 104   CO2 mmol/L 23 21   ANION GAP mmol/L 9 14*   BUN mg/dL 18 16   CREATININE mg/dL 1 16 1 23   CALCIUM mg/dL 7 1* 8 1*   GLUCOSE RANDOM mg/dL 97 124   ALT U/L  --  18   AST U/L  --  24   ALK PHOS U/L  --  74   ALBUMIN g/dL  --  3 1*   TOTAL BILIRUBIN mg/dL  --  0 29     Results from last 7 days   Lab Units 01/29/20  0350   MAGNESIUM mg/dL 1 5*   PHOSPHORUS mg/dL 2 3      Results from last 7 days   Lab Units 01/29/20  0008   INR  2 89*   PTT seconds 60*      Results from last 7 days   Lab Units 01/29/20  0927 01/29/20  0638 01/29/20  0314 01/29/20  0008   TROPONIN I ng/mL 3 36* 2 34* 2 50* 1 28*     Results from last 7 days   Lab Units 01/29/20  0135 01/28/20  2334   LACTIC ACID mmol/L 2 2* 7 3*     ABG:  Results from last 7 days   Lab Units 01/30/20  0123   PH ART  7 362   PCO2 ART mm Hg 36 5   PO2 ART mm Hg 118 6   HCO3 ART mmol/L 20 3*   BASE EXC ART mmol/L -4 5   ABG SOURCE  Radial, Right     VBG:  Results from last 7 days   Lab Units 01/30/20  0123   ABG SOURCE  Radial, Right     Results from last 7 days   Lab Units 01/29/20  0045   PROCALCITONIN ng/ml 1 09*       Micro  Results from last 7 days   Lab Units 01/29/20  0317 01/28/20  2336   BLOOD CULTURE   --  Received in Microbiology Lab  Culture in Progress  Received in Microbiology Lab  Culture in Progress  GRAM STAIN RESULT  1+ Polys  No organisms seen  --    LEGIONELLA URINARY ANTIGEN  Negative  --    STREP PNEUMONIAE ANTIGEN, URINE  Negative  --        EKG: Sinus tachycardia  Imaging: I have personally reviewed pertinent reports        Intake and Output  I/O       01/28 0701 - 01/29 0700 01/29 0701 - 01/30 0700    I V  (mL/kg)  477 2 (6 1)    Blood  360    IV Piggyback 2300 150    Total Intake(mL/kg) 2300 (29 4) 987 2 (12 6)    Urine (mL/kg/hr) 420 1245 (0 7)    Total Output 420 6775    Net +8710 -202 8                Height and Weights      IBW: -88 kg  Body mass index is 31 57 kg/m²    Weight (last 2 days)     Date/Time   Weight    01/28/20 2322   78 3 (172 62)                Nutrition        Active Medications  Scheduled Meds:    Current Facility-Administered Medications:  acetaminophen 650 mg Oral Q6H PRN FELICIANO La    cefTRIAXone 1,000 mg Intravenous Q24H Aries Host, CRNP Last Rate: Stopped (01/29/20 1218)   And        azithromycin 500 mg Intravenous Q24H Aries Host, CRNP Last Rate: 500 mg (01/30/20 0355)   chlorhexidine 15 mL Swish & Spit Q12H [facility] Aries Host, FELICIANO    ezetimibe 10 mg Oral Daily FELICIANO La    fentaNYL 50 mcg/hr Intravenous Continuous Aries Host CRNP Last Rate: 50 mcg/hr (01/30/20 0500)   hydrALAZINE 5 mg Intravenous Q6H PRN Aries Host, CRNP    hydroxychloroquine 200 mg Oral BID With Meals FELICIANO La    insulin lispro 1-5 Units Subcutaneous Q6H [facility] Aries Host, CRLA    ipratropium-albuterol 3 mL Nebulization Q6H Aries Host, CRLA    levalbuterol 1 25 mg Nebulization Q8H PRN Aries Host, CRLA    levothyroxine 50 mcg Oral Early Morning Brunilda FELICIANO Espinoza    lidocaine (PF)        losartan 25 mg Oral Daily Brunilda K FELICIANO Connell    pantoprazole 40 mg Oral Early Morning Brunilda K Jeromeofchuck, CRNP    pravastatin 80 mg Oral Daily With [facility] K FELICIANO Connell    predniSONE 7 5 mg Oral Daily FELICIANO La    propofol 5-50 mcg/kg/min Intravenous Titrated Aries Hunt CRNP Last Rate: 35 mcg/kg/min (01/30/20 8991)     Continuous Infusions:    fentaNYL 50 mcg/hr Last Rate: 50 mcg/hr (01/30/20 0500)   propofol 5-50 mcg/kg/min Last Rate: 35 mcg/kg/min (01/30/20 1849)     PRN Meds:     acetaminophen 650 mg Q6H PRN   hydrALAZINE 5 mg Q6H PRN   levalbuterol 1 25 mg Q8H PRN     ---------------------------------------------------------------------------------------  Advance Directive and Living Will:      Power of :    POLST: ---------------------------------------------------------------------------------------  Counseling / Coordination of Care  Total Critical Care time spent 60 minutes excluding procedures, teaching and family updates  FELICIANO Gomez      Portions of the record may have been created with voice recognition software  Occasional wrong word or "sound a like" substitutions may have occurred due to the inherent limitations of voice recognition software    Read the chart carefully and recognize, using context, where substitutions have occurred

## 2020-01-30 NOTE — PROGRESS NOTES
Upon assessment, patient complaining of shortness of breath while on bipap  Patient became cyanotic and desatted to the 76s  Patient was intubated with 20 of etomidate  Currently on propofol drip

## 2020-01-30 NOTE — ASSESSMENT & PLAN NOTE
Wt Readings from Last 3 Encounters:   01/28/20 78 3 kg (172 lb 9 9 oz)   09/15/16 83 2 kg (183 lb 8 oz)   03/17/16 82 7 kg (182 lb 6 1 oz)   patient has +3L balance since admission, given lasix for hypoxia with significant diuresis    6/2019 Echo with EF-65%  Will check echo  Monitor fluid status  Record I/Os

## 2020-01-30 NOTE — ASSESSMENT & PLAN NOTE
Re-intubated last night for severe hypoxia and respiratory distress  B/L airspace opacities in the setting of ILD noted on CT scan of chest  PCXR following intubation appears worse on right, there also may be a component of fluid overload; lasix 40 mg IV given  Continue ceftriaxone, azithromycin, antibiotic days #2  Aggressive pulmonary toileting  VAP bundle  Duonebs Q6 hours ATC and xopenex prn

## 2020-01-30 NOTE — ASSESSMENT & PLAN NOTE
History of hypothyroidism, Anemia of chronic disease  Vitamine B12 level-638, 11/2019  1unit of PRBCs transfused on 1/29/20  Trend hemoglobin, transfuse for hgb <7

## 2020-01-31 NOTE — PROCEDURES
Insert PICC line  Date/Time: 1/31/2020 11:39 AM  Performed by: Veva Sandifer, RN  Authorized by: FELICIANO Wells     Patient location:  Bedside  Other Assisting Provider: No    Consent:     Consent obtained:  Written (Soumya Murillo obtained consent )    Consent given by:  Spouse    Procedural risks discussed: by MD Lui protocol:     Procedure explained and questions answered to patient or proxy's satisfaction: yes      Relevant documents present and verified: yes      Test results available and properly labeled: yes      Radiology Images displayed and confirmed  If images not available, report reviewed: yes      Required blood products, implants, devices, and special equipment available: yes      Site/side marked: yes      Immediately prior to procedure, a time out was called: yes      Patient identity confirmed:  Verbally with patient and arm band  Pre-procedure details:     Hand hygiene: Hand hygiene performed prior to insertion      Sterile barrier technique: All elements of maximal sterile technique followed      Skin preparation:  ChloraPrep    Skin preparation agent: Skin preparation agent completely dried prior to procedure    Indications:     PICC line indications: medications requiring central line    Anesthesia (see MAR for exact dosages):      Anesthesia method:  Local infiltration    Local anesthetic:  Lidocaine 1% WITH epi  Procedure details:     Location:  Brachial    Vessel type: vein      Laterality:  Right    Site selection rationale:  Right basilic vein too small for a 5 FR picc     Approach: percutaneous technique used      Patient position:  Flat    Procedural supplies:  Triple lumen    Catheter size:  5 Fr    Landmarks identified: yes      Ultrasound guidance: yes      Sterile ultrasound techniques: Sterile gel and sterile probe covers were used      Number of attempts:  1    Successful placement: yes      Vessel of catheter tip end:  Chest Xray needed to confirm placement    Total catheter length (cm):  46    Catheter out on skin (cm):  1    Max flow rate:  999ml/hr     Arm circumference:  30  Post-procedure details:     Post-procedure:  Dressing applied and securement device placed    Assessment:  Blood return through all ports and placement verification pending x-ray result    Post-procedure complications: none      Patient tolerance of procedure:   Tolerated well, no immediate complications  Comments:      LOT # [id number] 12-31-20

## 2020-01-31 NOTE — PROGRESS NOTES
Patient not tolerating 65mL/hr tube feeding  Patient suctioned  [place] Pulse made aware  Adjusted patient's rate to 10 mL/hr per verbal order  Confirmed placement of OG tube by ascultation

## 2020-01-31 NOTE — PLAN OF CARE
Problem: PAIN - ADULT  Goal: Verbalizes/displays adequate comfort level or baseline comfort level  Description  Interventions:  - Encourage patient to monitor pain and request assistance  - Assess pain using appropriate pain scale  - Administer analgesics based on type and severity of pain and evaluate response  - Implement non-pharmacological measures as appropriate and evaluate response  - Consider cultural and social influences on pain and pain management  - Notify physician/advanced practitioner if interventions unsuccessful or patient reports new pain  Outcome: Progressing     Problem: INFECTION - ADULT  Goal: Absence or prevention of progression during hospitalization  Description  INTERVENTIONS:  - Assess and monitor for signs and symptoms of infection  - Monitor lab/diagnostic results  - Monitor all insertion sites, i e  indwelling lines, tubes, and drains  - Monitor endotracheal if appropriate and nasal secretions for changes in amount and color  - [place] appropriate cooling/warming therapies per order  - Administer medications as ordered  - Instruct and encourage patient and family to use good hand hygiene technique  - Identify and instruct in appropriate isolation precautions for identified infection/condition  Outcome: Progressing  Goal: Absence of fever/infection during neutropenic period  Description  INTERVENTIONS:  - Monitor WBC    Outcome: Progressing     Problem: SAFETY ADULT  Goal: Patient will remain free of falls  Description  INTERVENTIONS:  - Assess patient frequently for physical needs  -  Identify cognitive and physical deficits and behaviors that affect risk of falls    -  [place] fall precautions as indicated by assessment   - Educate patient/family on patient safety including physical limitations  - Instruct patient to call for assistance with activity based on assessment  - Modify environment to reduce risk of injury  - Consider OT/PT consult to assist with strengthening/mobility  Outcome: Progressing  Goal: Maintain or return to baseline ADL function  Description  INTERVENTIONS:  -  Assess patient's ability to carry out ADLs; assess patient's baseline for ADL function and identify physical deficits which impact ability to perform ADLs (bathing, care of mouth/teeth, toileting, grooming, dressing, etc )  - Assess/evaluate cause of self-care deficits   - Assess range of motion  - Assess patient's mobility; develop plan if impaired  - Assess patient's need for assistive devices and provide as appropriate  - Encourage maximum independence but intervene and supervise when necessary  - Involve family in performance of ADLs  - Assess for home care needs following discharge   - Consider OT consult to assist with ADL evaluation and planning for discharge  - Provide patient education as appropriate  Outcome: Progressing  Goal: Maintain or return mobility status to optimal level  Description  INTERVENTIONS:  - Assess patient's baseline mobility status (ambulation, transfers, stairs, etc )    - Identify cognitive and physical deficits and behaviors that affect mobility  - Identify mobility aids required to assist with transfers and/or ambulation (gait belt, sit-to-stand, lift, walker, cane, etc )  - [place] fall precautions as indicated by assessment  - Record patient progress and toleration of activity level on Mobility SBAR; progress patient to next Phase/Stage  - Instruct patient to call for assistance with activity based on assessment  - Consider rehabilitation consult to assist with strengthening/weightbearing, etc   Outcome: Progressing     Problem: DISCHARGE PLANNING  Goal: Discharge to home or other facility with appropriate resources  Description  INTERVENTIONS:  - Identify barriers to discharge w/patient and caregiver  - Arrange for needed discharge resources and transportation as appropriate  - Identify discharge learning needs (meds, wound care, etc )  - Arrange for interpretive services to assist at discharge as needed  - Refer to Case Management Department for coordinating discharge planning if the patient needs post-hospital services based on physician/advanced practitioner order or complex needs related to functional status, cognitive ability, or social support system  Outcome: Progressing     Problem: Knowledge Deficit  Goal: Patient/family/caregiver demonstrates understanding of disease process, treatment plan, medications, and discharge instructions  Description  Complete learning assessment and assess knowledge base  Interventions:  - Provide teaching at level of understanding  - Provide teaching via preferred learning methods  Outcome: Progressing     Problem: Prexisting or High Potential for Compromised Skin Integrity  Goal: Skin integrity is maintained or improved  Description  INTERVENTIONS:  - Identify patients at risk for skin breakdown  - Assess and monitor skin integrity  - Assess and monitor nutrition and hydration status  - Monitor labs   - Assess for incontinence   - Turn and reposition patient  - Assist with mobility/ambulation  - Relieve pressure over bony prominences  - Avoid friction and shearing  - Provide appropriate hygiene as needed including keeping skin clean and dry  - Evaluate need for skin moisturizer/barrier cream  - Collaborate with interdisciplinary team   - Patient/family teaching  - Consider wound care consult   Outcome: Progressing     Problem: Potential for Falls  Goal: Patient will remain free of falls  Description  INTERVENTIONS:  - Assess patient frequently for physical needs  -  Identify cognitive and physical deficits and behaviors that affect risk of falls    -  [place] fall precautions as indicated by assessment   - Educate patient/family on patient safety including physical limitations  - Instruct patient to call for assistance with activity based on assessment  - Modify environment to reduce risk of injury  - Consider OT/PT consult to assist with strengthening/mobility  Outcome: Progressing     Problem: RESPIRATORY - ADULT  Goal: Achieves optimal ventilation and oxygenation  Description  INTERVENTIONS:  - Assess for changes in respiratory status  - Assess for changes in mentation and behavior  - Position to facilitate oxygenation and minimize respiratory effort  - Oxygen administered by appropriate delivery if ordered  - Initiate smoking cessation education as indicated  - Encourage broncho-pulmonary hygiene including cough, deep breathe, Incentive Spirometry  - Assess the need for suctioning and aspirate as needed  - Assess and instruct to report SOB or any respiratory difficulty  - Respiratory Therapy support as indicated  Outcome: Progressing     Problem: Nutrition/Hydration-ADULT  Goal: Nutrient/Hydration intake appropriate for improving, restoring or maintaining nutritional needs  Description  Monitor and assess patient's nutrition/hydration status for malnutrition  Collaborate with interdisciplinary team and initiate plan and interventions as ordered  Monitor patient's weight and dietary intake as ordered or per policy  Utilize nutrition screening tool and intervene as necessary  Determine patient's food preferences and provide high-protein, high-caloric foods as appropriate       INTERVENTIONS:  - Monitor oral intake, urinary output, labs, and treatment plans  - Assess nutrition and hydration status and recommend course of action  - Evaluate amount of meals eaten  - Assist patient with eating if necessary   - Allow adequate time for meals  - Recommend/ encourage appropriate diets, oral nutritional supplements, and vitamin/mineral supplements  - Order, calculate, and assess calorie counts as needed  - Recommend, monitor, and adjust tube feedings and TPN/PPN based on assessed needs  - Assess need for intravenous fluids  - Provide specific nutrition/hydration education as appropriate  - Include patient/family/caregiver in decisions related to nutrition  Outcome: Progressing     Problem: DECISION MAKING  Goal: Pt/Family able to effectively weigh alternatives and participate in decision making related to treatment and care  Description  INTERVENTIONS:  - Identify decision maker  - Determine when there are differences among patient's view, family's view, and healthcare provider's view of patient condition and care goals  - Facilitate patient/family articulation of goals for care  - Help patient/family identify pros/cons of alternative solutions  - Provide information as requested by patient/family  - Respect patient/family rights related to privacy and sharing information   - Serve as a liaison between patient, family and health care team  - Initiate consults as appropriate (Ethics Team, Palliative Care, Family Care Conference, etc )  Outcome: Progressing     Problem: CONFUSION/THOUGHT DISTURBANCE  Goal: Thought disturbances (confusion, delirium, depression, dementia or psychosis) are managed to maintain or return to baseline mental status and functional level  Description  INTERVENTIONS:  - Assess for possible contributors to  thought disturbance, including but not limited to medications, infection, impaired vision or hearing, underlying metabolic abnormalities, dehydration, respiratory compromise,  psychiatric diagnoses and notify attending PHYSICAN/AP  - Monitor and intervene to maintain adequate nutrition, hydration, elimination, sleep and activity  - Decrease environmental stimuli, including noise as appropriate  - Provide frequent contacts to provide refocusing, direction and reassurance as needed  Approach patient calmly with eye contact and at their level    - [place] high risk fall precautions, aspiration precautions and other safety measures, as indicated  - If delirium suspected, notify physician/AP of change in condition and request immediate in-person evaluation  - Pursue consults as appropriate including Geriatric (campus dependent), OT for cognitive evaluation/activity planning, psychiatric, pastoral care, etc   Outcome: Progressing     Problem: BEHAVIOR  Goal: Pt/Family maintain appropriate behavior and adhere to behavioral management agreement, if implemented  Description  INTERVENTIONS:  - Assess the family dynamic   - Encourage verbalization of thoughts and concerns in a socially appropriate manner  - Assess patient/family's coping skills and non-compliant behavior (including use of illegal substances)  - Utilize positive, consistent limit setting strategies supporting safety of patient, staff and others  - Initiate consult with Case Management, Spiritual Care or other ancillary services as appropriate  - If a patient's/visitor's behavior jeopardizes the safety of the patient, staff, or others, refer to organization procedure     - Notify Security of behavior or suspected illegal substances which indicate the need for search of the patient and/or belongings  - Encourage participation in the decision making process about a behavioral management agreement; implement if patient meets criteria  Outcome: Progressing     Problem: SAFETY,RESTRAINT: NV/NON-SELF DESTRUCTIVE BEHAVIOR  Goal: Remains free of harm/injury (restraint for non violent/non self-detsructive behavior)  Description  INTERVENTIONS:  - Instruct patient/family regarding restraint use   - Assess and monitor physiologic and psychological status   - Provide interventions and comfort measures to meet assessed patient needs   - Identify and implement measures to help patient regain control  - Assess readiness for release of restraint   Outcome: Progressing  Goal: Returns to optimal restraint-free functioning  Description  INTERVENTIONS:  - Assess the patient's behavior and symptoms that indicate continued need for restraint  - Identify and implement measures to help patient regain control  - Assess readiness for release of restraint   Outcome: Progressing

## 2020-01-31 NOTE — PROGRESS NOTES
Progress Note - Cannon Peabody 1945, 76 y o  male MRN: [id number]    Unit/Bed#: ICU 06 Encounter: [id number]    Primary Care Provider: Isaías Herbert MD   Date and time admitted to hospital: 1/28/2020 11:02 PM        Acute and chronic respiratory failure with hypoxia [facility])  Assessment & Plan  · Re-intubated 1/29 for severe hypoxia and respiratory distress  · B/L airspace opacities in the setting of ILD noted on CT scan of chest  · PCXR following intubation appears worse on right, there also may be a component of fluid overload; lasix 40 mg IV given  · Continue ceftriaxone/vanc/flagyl, antibiotic day #3  · Aggressive pulmonary toileting  · VAP bundle  · Duonebs Q6 hours ATC and xopenex prn  · Intermittent high peak pressure alarms throughout the night with one episode of tachypnea, though continues with SpO2 >90% -- increase fentanyl gtt to 50mcg/hr, prn fentanyl dose given with some improvement   · Was bronched 1/30 -- concern for PNA versus ILD exacerbation versus alveolar hemorrhage     * Septic shock ([facility] [address] )  Assessment & Plan  · Likely secondary to multilobar pneumonia  · Patient has a history of MSSA bacteremia in 2019 with unknown etiology  · With initial lactic acidosis of 7, reduced to 2 following treatment  · BC x2 neg for 24H, flu negative   · Sputum culture in process  · Continue ceftriaxone, vanc, flagyl   · CT scan of chest with significant disease, R>L  · Bronchial washings pending   · Temp 100 last evening, plan for reculture if temp reaches 100 8      Elevated troponin level not due myocardial infarction  Assessment & Plan  · Likely due to demand ischemia  · Troponin spike>3     · Echo 1/30 -- EF 72%, grade 1 diastolic dysfunction, severe pulmonary HTN    Interstitial lung disease ([address] )  Assessment & Plan  · On 4LNC home oxygen around the clock  · On cellcept and prednisone as outpatient  · Follows with [facility], last seen 10/2019  · Started on solumedrol 1g IV 1/30    Community acquired pneumonia  Assessment & Plan  · Continue antibiotics as above  · Strep, legionella negative  · Rapid flu/ RSV negative    Thrombocythemia ([facility] [address] )  Assessment & Plan  · On plaquenil and hydrea as an outpatient  · Follows with hematology    History of DVT (deep vein thrombosis)  Assessment & Plan  · 1/72020 INR-2 0  · INR on admission 2 89  · Coumadin on hold given elevated INR    CHF (congestive heart failure) ([facility] [address] )  Assessment & Plan  Wt Readings from Last 3 Encounters:   01/28/20 78 3 kg (172 lb 9 9 oz)   09/15/16 83 2 kg (183 lb 8 oz)   03/17/16 82 7 kg (182 lb 6 1 oz)   patient has +3L balance since admission, given lasix for hypoxia with significant diuresis    · 6/2019 Echo with EF-65%  · Echo 1/30 EF 65%  · Monitor fluid status  · Record I/Os    Essential hypertension  Assessment & Plan  · Hold losartan  · Will monitor hemodynamics closely    Diabetes mellitus type 2 in nonobese [facility])  Assessment & Plan  No results found for: HGBA1C    Recent Labs     01/30/20  1324 01/30/20  1744 01/30/20  1813 01/31/20  0137   POCGLU 159* 175* 160* 349*       Blood Sugar Average: Last 72 hrs:  (P) 167 75  · 11/2019 Hb A1C-7 6  · Accuchecks with SSI coverage Q6 hours while NPO  · Hold outpatient meds    CKD (chronic kidney disease), stage II  Assessment & Plan  · Monitor renal indices, urine output  · Avoid nephrotoxins    Macrocytic anemia  Assessment & Plan  · History of hypothyroidism, Anemia of chronic disease  · Vitamine B12 level-638, 11/2019  · 1unit of PRBCs transfused on 1/29/20  · Trend hemoglobin, transfuse for hgb <7    ----------------------------------------------------------------------------------------  HPI/24hr events: He was bronched 1/30 after he was reintubated the previous night -- concerns for ILD exacerbation versus alveolar hemorrhage  He was started on solumedrol 1g IV   Overnight, intermittently had elevated peak pressures -- no secretions on deep suctioning -- fentanyl gtt increased, prn fentanyl given with improvement  Disposition: Continue Critical Care   Code Status: Level 1 - Full Code  ---------------------------------------------------------------------------------------  SUBJECTIVE  Intubated and sedated    Review of Systems  Review of systems was unable to be performed secondary to intubated and sedated  ---------------------------------------------------------------------------------------  OBJECTIVE    Vitals   Vitals:    20 0400 20 0445 20 0500 20 0515   BP: 110/53 102/50 102/50    Pulse: 104 100 100 100   Resp: 15      Temp: 99 3 °F (37 4 °C) 99 °F (37 2 °C) 99 °F (37 2 °C) 99 °F (37 2 °C)   TempSrc:       SpO2: 94% 93% 95% 97%   Weight:         Temp (24hrs), Av 3 °F (37 4 °C), Min:97 9 °F (36 6 °C), Max:100 8 °F (38 2 °C)  Current: Temperature: 99 °F (37 2 °C)        SpO2: SpO2: 97 %, SpO2 Activity: SpO2 Activity: At Rest, SpO2 Device: O2 Device: (Vent)       Physical Exam   Constitutional: He appears well-developed and well-nourished  He is easily aroused  He appears ill  No distress  He is sedated and intubated  HENT:   Head: Normocephalic and atraumatic  Mouth/Throat: Mucous membranes are normal  No oropharyngeal exudate  Eyes: Pupils are equal, round, and reactive to light  EOM and lids are normal  Right eye exhibits no discharge  Left eye exhibits no discharge  No scleral icterus  Neck: Normal range of motion  Neck supple  No tracheal deviation present  Cardiovascular: Regular rhythm, normal heart sounds and intact distal pulses  Tachycardia present  Exam reveals no gallop and no friction rub  No murmur heard  Pulmonary/Chest: He is intubated  He has decreased breath sounds  He has wheezes (very mild end-expiratory bibasilar )  He has rhonchi  He exhibits no tenderness  Abdominal: Soft  Normal appearance and bowel sounds are normal  He exhibits no distension  There is no tenderness  Musculoskeletal: Normal range of motion   He exhibits edema (generalized edema)  He exhibits no tenderness or deformity  Lymphadenopathy:     He has no cervical adenopathy  Neurological: He is easily aroused  Wakes to voice, moves extremities x4 spontaneously   Skin: Skin is warm and dry  He is not diaphoretic  There is pallor  Psychiatric:   Intubated and sedated    Nursing note and vitals reviewed        Invasive/non-invasive ventilation settings   Respiratory    Lab Data (Last 4 hours)    None         O2/Vent Data (Last 4 hours)      01/31 0211 01/31 0301         Vent Mode  AC/VC      Resp Rate (BPM) (BPM)  14      Vt (mL) (mL)  380      FIO2 (%) (%) 80 80      PEEP (cmH2O) (cmH2O)  10      MV  9 55                  Laboratory and Diagnostics:  Results from last 7 days   Lab Units 01/30/20  0748 01/29/20  1232 01/29/20  0638 01/28/20  2336   WBC Thousand/uL 9 50  --  9 58 9 69   HEMOGLOBIN g/dL 8 6* 6 8* 6 8* 8 8*   HEMATOCRIT % 26 3*  --  21 3* 27 3*   PLATELETS Thousands/uL 344  --  319 496*   NEUTROS PCT %  --   --   --  79*   BANDS PCT %  --   --  6  --    MONOS PCT %  --   --   --  6   MONO PCT %  --   --  9  --      Results from last 7 days   Lab Units 01/30/20  0748 01/29/20  0638 01/29/20  0008   SODIUM mmol/L 140 141 139   POTASSIUM mmol/L 3 9 3 8 3 3*   CHLORIDE mmol/L 104 109* 104   CO2 mmol/L 25 23 21   ANION GAP mmol/L 11 9 14*   BUN mg/dL 17 18 16   CREATININE mg/dL 1 32* 1 16 1 23   CALCIUM mg/dL 7 9* 7 1* 8 1*   GLUCOSE RANDOM mg/dL 129 97 124   ALT U/L  --   --  18   AST U/L  --   --  24   ALK PHOS U/L  --   --  74   ALBUMIN g/dL  --   --  3 1*   TOTAL BILIRUBIN mg/dL  --   --  0 29     Results from last 7 days   Lab Units 01/30/20  0748 01/29/20  0350   MAGNESIUM mg/dL 2 2 1 5*   PHOSPHORUS mg/dL  --  2 3      Results from last 7 days   Lab Units 01/31/20  0458 01/30/20  0748 01/29/20  0008   INR  2 13* 2 96* 2 89*   PTT seconds  --   --  60*      Results from last 7 days   Lab Units 01/29/20  0927 01/29/20  6050 01/29/20  2129 01/29/20  0008   TROPONIN I ng/mL 3 36* 2 34* 2 50* 1 28*     Results from last 7 days   Lab Units 01/29/20  0135 01/28/20  2334   LACTIC ACID mmol/L 2 2* 7 3*     ABG:  Results from last 7 days   Lab Units 01/30/20  0123   PH ART  7 362   PCO2 ART mm Hg 36 5   PO2 ART mm Hg 118 6   HCO3 ART mmol/L 20 3*   BASE EXC ART mmol/L -4 5   ABG SOURCE  Radial, Right     VBG:  Results from last 7 days   Lab Units 01/30/20  0123   ABG SOURCE  Radial, Right     Results from last 7 days   Lab Units 01/29/20  0045   PROCALCITONIN ng/ml 1 09*       Micro  Results from last 7 days   Lab Units 01/29/20  0317 01/28/20  2336   BLOOD CULTURE   --  No Growth at 24 hrs  No Growth at 24 hrs  SPUTUM CULTURE  Culture too young- will reincubate  --    GRAM STAIN RESULT  1+ Polys  No organisms seen  --    LEGIONELLA URINARY ANTIGEN  Negative  --    STREP PNEUMONIAE ANTIGEN, URINE  Negative  --        EKG: ST on tele,   Imaging: No new imaging  XR chest portable ICU   Final Result      Endotracheal tube tip at zana  Recommend retraction approximately 3 to 4 cm  The examination demonstrates a significant  finding and was documented as such in [facility] for liaison and referring practitioner notification  Workstation performed: [id number]         XR chest portable   Final Result      Persistent severe bilateral pulmonary airspace disease slightly worsened since prior            Workstation performed: [id number]         CTA ED chest PE study   Final Result      No evidence of central pulmonary embolism  Distal segmental branches and subsegmental branches are not well assessed on this study  Extensive consolidative changes in the right greater than left lower lobe, right middle lobe and to a lesser extent in the remainder of the lungs suspicious for multifocal pneumonia  Superimposed interlobular septal thickening could be related to    component of pulmonary edema or interstitial lung disease        Endotracheal tube terminates immediately above the level of the zana  Consider slight retraction of the tube  Bibasilar predominant reticulations and honeycombing consistent with history of pulmonary fibrosis  Workstation performed: [id number]         XR chest 1 view portable   Final Result      Findings suggest asymmetric pulmonary edema and/or pneumonia  Workstation performed: [id number]             Intake and Output  I/O       01/29 0701 - 01/30 0700 01/30 0701 - 01/31 0700    I V  (mL/kg) 609 (7 8) 447 8 (5 7)    Blood 360     IV Piggyback 150 850    Total Intake(mL/kg) 1119 (14 3) 1297 8 (16 6)    Urine (mL/kg/hr) 2570 (1 4) 1205 (0 6)    Emesis/NG output  0    Total Output 2570 1205    Net -1451 +92 8                Height and Weights      IBW: -88 kg  Body mass index is 31 57 kg/m²  Weight (last 2 days)     None            Nutrition       Diet Orders   (From admission, onward)             Start     Ordered    01/30/20 0845  Diet Enteral/Parenteral; Tube Feeding No Oral Diet; Jevity 1 2 Favian; Continuous; 65; 150; Every 4 hours  Diet effective now     Question Answer Comment   Diet Type Enteral/Parenteral    Enteral/Parenteral Tube Feeding No Oral Diet    Tube Feeding Formula: Jevity 1 2 Favian    Bolus/Cyclic/Continuous Continuous    Tube Feeding Goal Rate (mL/hr): 65    Tube Feeding water flush (mL): 150    Water flush frequency: Every 4 hours    RD to adjust diet per protocol?  No        01/30/20 0845                  Active Medications  Scheduled Meds:    Current Facility-Administered Medications:  acetaminophen 650 mg Oral Q6H PRN FELICIANO Marques    cefepime 2,000 mg Intravenous Q12H FELICIANO Hernandez Last Rate: Stopped (01/30/20 2207)   chlorhexidine 15 mL [facility] FELICIANO Cano    ezetimibe 10 mg Oral Daily FELICIANO Marques    fentaNYL 50 mcg/hr Intravenous Continuous FELICIANO Valdez Last Rate: 50 mcg/hr (01/31/20 0203)   hydrALAZINE 5 mg Intravenous Q6H PRN Alexis Macias Annamarie FELICIANO Meyer    hydroxychloroquine 200 mg Oral BID With [facility] Becki, FELICIANO    insulin lispro 1-5 Units Subcutaneous Q6H [address] [address], CRNP    ipratropium-albuterol 3 mL Nebulization Q6H Tiny Rumble, FELICIANO    levalbuterol 1 25 mg Nebulization Q8H PRN Tiny Rumble, CRNP    levothyroxine 50 mcg Oral Early Morning Brunilda K Jeromeofchuck, FELICIANO    losartan 25 mg Oral Daily Brunilda K Becki, FELICIANO    methylPREDNISolone sodium succinate 1,000 mg Intravenous Daily FELICIANO De La Cruz Last Rate: Stopped (01/30/20 1900)   metroNIDAZOLE 500 mg Intravenous Q8H Brunilda K FELICIANO Connell Last Rate: Stopped (01/31/20 0302)   omeprazole (PRILOSEC) suspension 2 mg/mL 20 mg Oral Daily Brunilda K Becki, FELICIANO    pravastatin 80 mg Oral Daily With [facility] FELICIANO Connell    propofol 5-50 mcg/kg/min Intravenous Titrated Tiny FELICIANO Dodson Last Rate: 40 mcg/kg/min (01/31/20 0346)   vancomycin 10 mg/kg (Adjusted) Intravenous Q12H Nupur Warren MD      Continuous Infusions:    fentaNYL 50 mcg/hr Last Rate: 50 mcg/hr (01/31/20 0203)   propofol 5-50 mcg/kg/min Last Rate: 40 mcg/kg/min (01/31/20 0346)     PRN Meds:     acetaminophen 650 mg Q6H PRN   hydrALAZINE 5 mg Q6H PRN   levalbuterol 1 25 mg Q8H PRN     ---------------------------------------------------------------------------------------  Advance Directive and Living Will:      Power of :    POLST:    ---------------------------------------------------------------------------------------    FELICIANO Rothman      Portions of the record may have been created with voice recognition software  Occasional wrong word or "sound a like" substitutions may have occurred due to the inherent limitations of voice recognition software    Read the chart carefully and recognize, using context, where substitutions have occurred

## 2020-01-31 NOTE — ASSESSMENT & PLAN NOTE
Wt Readings from Last 3 Encounters:   01/28/20 78 3 kg (172 lb 9 9 oz)   09/15/16 83 2 kg (183 lb 8 oz)   03/17/16 82 7 kg (182 lb 6 1 oz)   patient has +3L balance since admission, given lasix for hypoxia with significant diuresis    · 6/2019 Echo with EF-65%  · Echo 1/30 EF 65%  · Monitor fluid status  · Record I/Os

## 2020-01-31 NOTE — ASSESSMENT & PLAN NOTE
· Re-intubated 1/29 for severe hypoxia and respiratory distress  · B/L airspace opacities in the setting of ILD noted on CT scan of chest  · PCXR following intubation appears worse on right, there also may be a component of fluid overload; lasix 40 mg IV given  · Continue ceftriaxone/vanc/flagyl, antibiotic day #3  · Aggressive pulmonary toileting  · VAP bundle  · Duonebs Q6 hours ATC and xopenex prn  · Intermittent high peak pressure alarms throughout the night with one episode of tachypnea, though continues with SpO2 >90% -- increase fentanyl gtt to 50mcg/hr, prn fentanyl dose given with some improvement   · Was bronched 1/30 -- concern for PNA versus ILD exacerbation versus alveolar hemorrhage

## 2020-01-31 NOTE — ASSESSMENT & PLAN NOTE
No results found for: HGBA1C    Recent Labs     01/30/20  1324 01/30/20  1744 01/30/20  1813 01/31/20  0137   POCGLU 159* 175* 160* 349*       Blood Sugar Average: Last 72 hrs:  (P) 167 75  · 11/2019 Hb A1C-7 6  · Accuchecks with SSI coverage Q6 hours while NPO  · Hold outpatient meds

## 2020-01-31 NOTE — ASSESSMENT & PLAN NOTE
· On 4LNC home oxygen around the clock  · On cellcept and prednisone as outpatient  · Follows with [facility] pulmonary, last seen 10/2019  · Started on solumedrol 1g IV 1/30

## 2020-01-31 NOTE — ASSESSMENT & PLAN NOTE
· Likely due to demand ischemia  · Troponin spike>3     · Echo 1/30 -- EF 78%, grade 1 diastolic dysfunction, severe pulmonary HTN

## 2020-01-31 NOTE — ASSESSMENT & PLAN NOTE
· History of hypothyroidism, Anemia of chronic disease  · Vitamine B12 level-638, 11/2019  · 1unit of PRBCs transfused on 1/29/20  · Trend hemoglobin, transfuse for hgb <7

## 2020-01-31 NOTE — PLAN OF CARE
Problem: PAIN - ADULT  Goal: Verbalizes/displays adequate comfort level or baseline comfort level  Description  Interventions:  - Encourage patient to monitor pain and request assistance  - Assess pain using appropriate pain scale  - Administer analgesics based on type and severity of pain and evaluate response  - Implement non-pharmacological measures as appropriate and evaluate response  - Consider cultural and social influences on pain and pain management  - Notify physician/advanced practitioner if interventions unsuccessful or patient reports new pain  Outcome: Progressing     Problem: INFECTION - ADULT  Goal: Absence or prevention of progression during hospitalization  Description  INTERVENTIONS:  - Assess and monitor for signs and symptoms of infection  - Monitor lab/diagnostic results  - Monitor all insertion sites, i e  indwelling lines, tubes, and drains  - Monitor endotracheal if appropriate and nasal secretions for changes in amount and color  - [place] appropriate cooling/warming therapies per order  - Administer medications as ordered  - Instruct and encourage patient and family to use good hand hygiene technique  - Identify and instruct in appropriate isolation precautions for identified infection/condition  Outcome: Progressing  Goal: Absence of fever/infection during neutropenic period  Description  INTERVENTIONS:  - Monitor WBC    Outcome: Progressing     Problem: SAFETY ADULT  Goal: Patient will remain free of falls  Description  INTERVENTIONS:  - Assess patient frequently for physical needs  -  Identify cognitive and physical deficits and behaviors that affect risk of falls    -  [place] fall precautions as indicated by assessment   - Educate patient/family on patient safety including physical limitations  - Instruct patient to call for assistance with activity based on assessment  - Modify environment to reduce risk of injury  - Consider OT/PT consult to assist with strengthening/mobility  Outcome: Progressing  Goal: Maintain or return to baseline ADL function  Description  INTERVENTIONS:  -  Assess patient's ability to carry out ADLs; assess patient's baseline for ADL function and identify physical deficits which impact ability to perform ADLs (bathing, care of mouth/teeth, toileting, grooming, dressing, etc )  - Assess/evaluate cause of self-care deficits   - Assess range of motion  - Assess patient's mobility; develop plan if impaired  - Assess patient's need for assistive devices and provide as appropriate  - Encourage maximum independence but intervene and supervise when necessary  - Involve family in performance of ADLs  - Assess for home care needs following discharge   - Consider OT consult to assist with ADL evaluation and planning for discharge  - Provide patient education as appropriate  Outcome: Progressing  Goal: Maintain or return mobility status to optimal level  Description  INTERVENTIONS:  - Assess patient's baseline mobility status (ambulation, transfers, stairs, etc )    - Identify cognitive and physical deficits and behaviors that affect mobility  - Identify mobility aids required to assist with transfers and/or ambulation (gait belt, sit-to-stand, lift, walker, cane, etc )  - [place] fall precautions as indicated by assessment  - Record patient progress and toleration of activity level on Mobility SBAR; progress patient to next Phase/Stage  - Instruct patient to call for assistance with activity based on assessment  - Consider rehabilitation consult to assist with strengthening/weightbearing, etc   Outcome: Progressing     Problem: DISCHARGE PLANNING  Goal: Discharge to home or other facility with appropriate resources  Description  INTERVENTIONS:  - Identify barriers to discharge w/patient and caregiver  - Arrange for needed discharge resources and transportation as appropriate  - Identify discharge learning needs (meds, wound care, etc )  - Arrange for interpretive services to assist at discharge as needed  - Refer to Case Management Department for coordinating discharge planning if the patient needs post-hospital services based on physician/advanced practitioner order or complex needs related to functional status, cognitive ability, or social support system  Outcome: Progressing     Problem: Knowledge Deficit  Goal: Patient/family/caregiver demonstrates understanding of disease process, treatment plan, medications, and discharge instructions  Description  Complete learning assessment and assess knowledge base  Interventions:  - Provide teaching at level of understanding  - Provide teaching via preferred learning methods  Outcome: Progressing     Problem: Prexisting or High Potential for Compromised Skin Integrity  Goal: Skin integrity is maintained or improved  Description  INTERVENTIONS:  - Identify patients at risk for skin breakdown  - Assess and monitor skin integrity  - Assess and monitor nutrition and hydration status  - Monitor labs   - Assess for incontinence   - Turn and reposition patient  - Assist with mobility/ambulation  - Relieve pressure over bony prominences  - Avoid friction and shearing  - Provide appropriate hygiene as needed including keeping skin clean and dry  - Evaluate need for skin moisturizer/barrier cream  - Collaborate with interdisciplinary team   - Patient/family teaching  - Consider wound care consult   Outcome: Progressing     Problem: Potential for Falls  Goal: Patient will remain free of falls  Description  INTERVENTIONS:  - Assess patient frequently for physical needs  -  Identify cognitive and physical deficits and behaviors that affect risk of falls    -  [place] fall precautions as indicated by assessment   - Educate patient/family on patient safety including physical limitations  - Instruct patient to call for assistance with activity based on assessment  - Modify environment to reduce risk of injury  - Consider OT/PT consult to assist with strengthening/mobility  Outcome: Progressing     Problem: RESPIRATORY - ADULT  Goal: Achieves optimal ventilation and oxygenation  Description  INTERVENTIONS:  - Assess for changes in respiratory status  - Assess for changes in mentation and behavior  - Position to facilitate oxygenation and minimize respiratory effort  - Oxygen administered by appropriate delivery if ordered  - Initiate smoking cessation education as indicated  - Encourage broncho-pulmonary hygiene including cough, deep breathe, Incentive Spirometry  - Assess the need for suctioning and aspirate as needed  - Assess and instruct to report SOB or any respiratory difficulty  - Respiratory Therapy support as indicated  Outcome: Progressing     Problem: Nutrition/Hydration-ADULT  Goal: Nutrient/Hydration intake appropriate for improving, restoring or maintaining nutritional needs  Description  Monitor and assess patient's nutrition/hydration status for malnutrition  Collaborate with interdisciplinary team and initiate plan and interventions as ordered  Monitor patient's weight and dietary intake as ordered or per policy  Utilize nutrition screening tool and intervene as necessary  Determine patient's food preferences and provide high-protein, high-caloric foods as appropriate       INTERVENTIONS:  - Monitor oral intake, urinary output, labs, and treatment plans  - Assess nutrition and hydration status and recommend course of action  - Evaluate amount of meals eaten  - Assist patient with eating if necessary   - Allow adequate time for meals  - Recommend/ encourage appropriate diets, oral nutritional supplements, and vitamin/mineral supplements  - Order, calculate, and assess calorie counts as needed  - Recommend, monitor, and adjust tube feedings and TPN/PPN based on assessed needs  - Assess need for intravenous fluids  - Provide specific nutrition/hydration education as appropriate  - Include patient/family/caregiver in decisions related to nutrition  Outcome: Progressing     Problem: DECISION MAKING  Goal: Pt/Family able to effectively weigh alternatives and participate in decision making related to treatment and care  Description  INTERVENTIONS:  - Identify decision maker  - Determine when there are differences among patient's view, family's view, and healthcare provider's view of patient condition and care goals  - Facilitate patient/family articulation of goals for care  - Help patient/family identify pros/cons of alternative solutions  - Provide information as requested by patient/family  - Respect patient/family rights related to privacy and sharing information   - Serve as a liaison between patient, family and health care team  - Initiate consults as appropriate (Ethics Team, Palliative Care, Family Care Conference, etc )  Outcome: Progressing     Problem: CONFUSION/THOUGHT DISTURBANCE  Goal: Thought disturbances (confusion, delirium, depression, dementia or psychosis) are managed to maintain or return to baseline mental status and functional level  Description  INTERVENTIONS:  - Assess for possible contributors to  thought disturbance, including but not limited to medications, infection, impaired vision or hearing, underlying metabolic abnormalities, dehydration, respiratory compromise,  psychiatric diagnoses and notify attending PHYSICAN/AP  - Monitor and intervene to maintain adequate nutrition, hydration, elimination, sleep and activity  - Decrease environmental stimuli, including noise as appropriate  - Provide frequent contacts to provide refocusing, direction and reassurance as needed  Approach patient calmly with eye contact and at their level    - [place] high risk fall precautions, aspiration precautions and other safety measures, as indicated  - If delirium suspected, notify physician/AP of change in condition and request immediate in-person evaluation  - Pursue consults as appropriate including Geriatric (campus dependent), OT for cognitive evaluation/activity planning, psychiatric, pastoral care, etc   Outcome: Progressing     Problem: BEHAVIOR  Goal: Pt/Family maintain appropriate behavior and adhere to behavioral management agreement, if implemented  Description  INTERVENTIONS:  - Assess the family dynamic   - Encourage verbalization of thoughts and concerns in a socially appropriate manner  - Assess patient/family's coping skills and non-compliant behavior (including use of illegal substances)  - Utilize positive, consistent limit setting strategies supporting safety of patient, staff and others  - Initiate consult with Case Management, Spiritual Care or other ancillary services as appropriate  - If a patient's/visitor's behavior jeopardizes the safety of the patient, staff, or others, refer to organization procedure     - Notify Security of behavior or suspected illegal substances which indicate the need for search of the patient and/or belongings  - Encourage participation in the decision making process about a behavioral management agreement; implement if patient meets criteria  Outcome: Progressing

## 2020-01-31 NOTE — PROGRESS NOTES
Vancomycin IV Pharmacy-to-Dose Consultation    Andre Kothari is a 76 y o  male who is currently receiving Vancomycin IV with management by the Pharmacy Consult service  Assessment/Plan:  The patient was reviewed  Renal function is stable and no signs or symptoms of nephrotoxicity and/or infusion reactions were documented in the chart  Based on todays assessment, continue current vancomycin (day # 2) dosing of 750mg IV every 12 hours, with a plan for trough to be drawn at 2245 on 1/31/20  We will continue to follow the patients culture results and clinical progress daily      Zonia Hahn, Pharmacist

## 2020-02-01 NOTE — ASSESSMENT & PLAN NOTE
Wt Readings from Last 3 Encounters:   01/31/20 78 6 kg (173 lb 4 5 oz)   09/15/16 83 2 kg (183 lb 8 oz)   03/17/16 82 7 kg (182 lb 6 1 oz)   patient has +3L balance since admission, given lasix for hypoxia with significant diuresis    · 6/2019 Echo with EF-65%  · Echo 1/30 EF 65%  · Monitor fluid status   · Record I/Os

## 2020-02-01 NOTE — ASSESSMENT & PLAN NOTE
No results found for: HGBA1C    Recent Labs     01/31/20  0601 01/31/20  1221 01/31/20  1746 02/01/20  0004   POCGLU 288* 374* 290* 362*       Blood Sugar Average: Last 72 hrs:  (P) 983 [id number]  · 11/2019 Hb A1C-7 6  · Accuchecks with SSI coverage Q6 hours while NPO  · Hold outpatient meds

## 2020-02-01 NOTE — ASSESSMENT & PLAN NOTE
· Likely secondary to multilobar pneumonia  · Patient has a history of MSSA bacteremia in 2019 with unknown etiology  · With initial lactic acidosis of 7, reduced to 2 following treatment  · BC x2 neg for 24H, flu negative   · Sputum culture in process  · Continue ceftriaxone, vanc, flagyl   · CT scan of chest with significant disease, R>L  · Bronchial washings pending   · Temp 100 last evening, plan for reculture if temp reaches 100 8

## 2020-02-01 NOTE — ASSESSMENT & PLAN NOTE
· Re-intubated 1/29 for severe hypoxia and respiratory distress  · B/L airspace opacities in the setting of ILD noted on CT scan of chest  · PCXR following intubation appears worse on right, there also may be a component of fluid overload; lasix 40 mg IV given  · Continue ceftriaxone/vanc/flagyl, antibiotic day 5  · Aggressive pulmonary toileting  · VAP bundle  · Duonebs Q6 hours ATC and xopenex prn  · Was bronched 1/30 -- concern for PNA versus ILD exacerbation versus alveolar hemorrhage   · Changed to PS 1/31 with goal -500

## 2020-02-01 NOTE — ASSESSMENT & PLAN NOTE
· On 4LNC home oxygen around the clock  · On cellcept and prednisone as outpatient  · Follows with [facility], last seen 10/2019  · Started on solumedrol 1g IV 1/30 -- continue 3-5 days, then wean

## 2020-02-01 NOTE — PROGRESS NOTES
Progress Note - Cecile Baca 1945, 76 y o  male MRN: [id number]    Unit/Bed#: ICU 06 Encounter: [id number]    Primary Care Provider: Sydnee Lema MD   Date and time admitted to hospital: 1/28/2020 11:02 PM        Acute and chronic respiratory failure with hypoxia [facility])  Assessment & Plan  · Re-intubated 1/29 for severe hypoxia and respiratory distress  · B/L airspace opacities in the setting of ILD noted on CT scan of chest  · PCXR following intubation appears worse on right, there also may be a component of fluid overload; lasix 40 mg IV given  · Continue ceftriaxone/vanc/flagyl, antibiotic day 5  · Aggressive pulmonary toileting  · VAP bundle  · Duonebs Q6 hours ATC and xopenex prn  · Was bronched 1/30 -- concern for PNA versus ILD exacerbation versus alveolar hemorrhage   · Changed to PS 1/31 with goal -500    * Septic shock ([address] )  Assessment & Plan  · Likely secondary to multilobar pneumonia  · Patient has a history of MSSA bacteremia in 2019 with unknown etiology  · With initial lactic acidosis of 7, reduced to 2 following treatment  · BC x2 neg for 24H, flu negative   · Sputum culture in process  · Continue ceftriaxone, vanc, flagyl   · CT scan of chest with significant disease, R>L  · Bronchial washings pending   · Temp 100 last evening, plan for reculture if temp reaches 100 8       Elevated troponin level not due myocardial infarction  Assessment & Plan  · Likely due to demand ischemia  · Troponin spike>3     · Echo 1/30 -- EF 57%, grade 1 diastolic dysfunction, severe pulmonary HTN     Interstitial lung disease (HCC)  Assessment & Plan  · On 4LNC home oxygen around the clock  · On cellcept and prednisone as outpatient  · Follows with [facility] pulmonary, last seen 10/2019  · Started on solumedrol 1g IV 1/30 -- continue 3-5 days, then wean     Community acquired pneumonia  Assessment & Plan  · Continue antibiotics as above  · Strep, legionella negative  · Rapid flu/ RSV negative Thrombocythemia ([facility] [address] )  Assessment & Plan  · On plaquenil and hydrea as an outpatient  · Follows with hematology     History of DVT (deep vein thrombosis)  Assessment & Plan  · 1/72020 INR-2 0  · INR on admission 2 89  · Coumadin on hold given elevated INR     [facility]estive [facility] failure) [facility] )  Assessment & Plan  Wt Readings from Last 3 Encounters:   01/31/20 78 6 kg (173 lb 4 5 oz)   09/15/16 83 2 kg (183 lb 8 oz)   03/17/16 82 7 kg (182 lb 6 1 oz)   patient has +3L balance since admission, given lasix for hypoxia with significant diuresis    · 6/2019 Echo with EF-65%  · Echo 1/30 EF 65%  · Monitor fluid status   · Record I/Os    Essential hypertension  Assessment & Plan  · Hold losartan  · Will monitor hemodynamics closely     Diabetes mellitus type 2 in nonobese [facility])  Assessment & Plan  No results found for: HGBA1C    Recent Labs     01/31/20  0601 01/31/20  1221 01/31/20  1746 02/01/20  0004   POCGLU 288* 374* 290* 362*       Blood Sugar Average: Last 72 hrs:  (P) 907 [id number]  · 11/2019 Hb A1C-7 6  · Accuchecks with SSI coverage Q6 hours while NPO  · Hold outpatient meds     CKD (chronic kidney disease), stage II  Assessment & Plan  · Monitor renal indices, urine output  · Avoid nephrotoxins     ----------------------------------------------------------------------------------------  HPI/24hr events: Yesterday was changed to PS with VT goals of 400-500  High-dose steroids continued  PICC line was inserted due to lack of PIV access       Disposition: Continue Critical Care   Code Status: Level 1 - Full Code  ---------------------------------------------------------------------------------------  SUBJECTIVE  Intubated and sedated     Review of Systems   Unable to perform ROS: Intubated     Review of systems was unable to be performed secondary to intubated and sedated   ---------------------------------------------------------------------------------------  OBJECTIVE    Vitals   Vitals:    02/01/20 0100 20 0200 20 0258 20 0400   BP: 137/72 115/69  95/53   Pulse: 86 82  82   Resp:       Temp: 97 9 °F (36 6 °C) 97 5 °F (36 4 °C)  97 5 °F (36 4 °C)   TempSrc:       SpO2: 93% 98% 99% 99%   Weight:  79 kg (174 lb 2 6 oz)       Temp (24hrs), Av 6 °F (37 °C), Min:97 5 °F (36 4 °C), Max:99 °F (37 2 °C)  Current: Temperature: 97 5 °F (36 4 °C)        SpO2: SpO2: 99 %, SpO2 Activity: SpO2 Activity: At Rest, SpO2 Device: O2 Device: (Vent)       Physical Exam   Constitutional: He is oriented to person, place, and time  He appears well-developed and well-nourished  He is easily aroused  No distress  He is sedated, intubated and restrained  HENT:   Head: Normocephalic and atraumatic  Mouth/Throat: No oropharyngeal exudate  Eyes: EOM and lids are normal  Right eye exhibits no discharge  Left eye exhibits no discharge  No scleral icterus  Neck: Normal range of motion  Neck supple  No tracheal deviation present  Cardiovascular: Normal rate, regular rhythm, normal heart sounds and intact distal pulses  Exam reveals no gallop and no friction rub  No murmur heard  Pulses:       Dorsalis pedis pulses are 1+ on the right side, and 1+ on the left side  Pulmonary/Chest: He is intubated  He has rhonchi (coarse, throughout)  Abdominal: Soft  Normal appearance and bowel sounds are normal  There is no tenderness  Genitourinary:   Genitourinary Comments: Pelletier present   Musculoskeletal: Normal range of motion  He exhibits no edema, tenderness or deformity  Lymphadenopathy:     He has no cervical adenopathy  Neurological: He is alert, oriented to person, place, and time and easily aroused  No cranial nerve deficit  Opens eyes to voice   Skin: Skin is warm and dry  Capillary refill takes less than 2 seconds  No rash noted  He is not diaphoretic  No erythema  No pallor  Psychiatric:   Sedated    Nursing note and vitals reviewed        Invasive/non-invasive ventilation settings   Respiratory Lab Data (Last 4 hours)    None         O2/Vent Data (Last 4 hours)      02/01 0258           Vent Mode AC/PC       Resp Rate (BPM) (BPM) 14       Pressure Control (cmH2O) (cm) 5       Insp Time (sec) (sec) 1       FiO2 (%) (%) 70       PEEP (cmH2O) (cmH2O) 10       MV 7 54                   Laboratory and Diagnostics:  Results from last 7 days   Lab Units 02/01/20  0452 01/31/20  0458 01/30/20  0748 01/29/20  1232 01/29/20  0638 01/28/20  2336   WBC Thousand/uL  --  1 79* 9 50  --  9 58 9 69   HEMOGLOBIN g/dL 7 2* 8 1* 8 6* 6 8* 6 8* 8 8*   HEMATOCRIT %  --  25 0* 26 3*  --  21 3* 27 3*   PLATELETS Thousands/uL  --  329 344  --  319 496*   NEUTROS PCT %  --   --   --   --   --  79*   BANDS PCT %  --   --   --   --  6  --    MONOS PCT %  --   --   --   --   --  6   MONO PCT %  --   --   --   --  9  --      Results from last 7 days   Lab Units 02/01/20  0413 01/31/20  0458 01/30/20  0748 01/29/20  0638 01/29/20  0008   SODIUM mmol/L 141 138 140 141 139   POTASSIUM mmol/L 3 9 3 8 3 9 3 8 3 3*   CHLORIDE mmol/L 109* 105 104 109* 104   CO2 mmol/L 26 24 25 23 21   ANION GAP mmol/L 6 9 11 9 14*   BUN mg/dL 25 22 17 18 16   CREATININE mg/dL 1 18 1 47* 1 32* 1 16 1 23   CALCIUM mg/dL 7 7* 7 6* 7 9* 7 1* 8 1*   GLUCOSE RANDOM mg/dL 224* 294* 129 97 124   ALT U/L  --   --   --   --  18   AST U/L  --   --   --   --  24   ALK PHOS U/L  --   --   --   --  74   ALBUMIN g/dL  --   --   --   --  3 1*   TOTAL BILIRUBIN mg/dL  --   --   --   --  0 29     Results from last 7 days   Lab Units 01/30/20  0748 01/29/20  0350   MAGNESIUM mg/dL 2 2 1 5*   PHOSPHORUS mg/dL  --  2 3      Results from last 7 days   Lab Units 01/31/20  0458 01/30/20  0748 01/29/20  0008   INR  2 13* 2 96* 2 89*   PTT seconds  --   --  60*      Results from last 7 days   Lab Units 01/29/20  0927 01/29/20  0638 01/29/20  0314 01/29/20  0008   TROPONIN I ng/mL 3 36* 2 34* 2 50* 1 28*     Results from last 7 days   Lab Units 01/29/20  0135 01/28/20  2334 LACTIC ACID mmol/L 2 2* 7 3*     ABG:  Results from last 7 days   Lab Units 01/30/20  0123   PH ART  7 362   PCO2 ART mm Hg 36 5   PO2 ART mm Hg 118 6   HCO3 ART mmol/L 20 3*   BASE EXC ART mmol/L -4 5   ABG SOURCE  Radial, Right     VBG:  Results from last 7 days   Lab Units 01/30/20  0123   ABG SOURCE  Radial, Right     Results from last 7 days   Lab Units 01/29/20  0045   PROCALCITONIN ng/ml 1 09*       Micro  Results from last 7 days   Lab Units 01/30/20  1725 01/29/20  0317 01/28/20  2336   BLOOD CULTURE   --   --  No Growth at 48 hrs  No Growth at 48 hrs  SPUTUM CULTURE   --  Few Colonies of   --    GRAM STAIN RESULT  No Polys or Bacteria seen 1+ Polys  No organisms seen  --    LEGIONELLA URINARY ANTIGEN   --  Negative  --    STREP PNEUMONIAE ANTIGEN, URINE   --  Negative  --        EKG: NSR on tele   Imaging: I have personally reviewed pertinent reports  XR chest PICC line portable   Final Result      Right PICC line tip difficult to identify but appears to be at cavoatrial level  ET tube tip 1 8 cm above zana  NG tube tip coiled upon itself and at the GE junction  Worsening bilateral airspace disease  The study was marked in EPIC for significant notification  Workstation performed: [id number]         XR chest portable ICU   Final Result      Endotracheal tube tip at zana  Recommend retraction approximately 3 to 4 cm  The examination demonstrates a significant  finding and was documented as such [facility] for liaison and referring practitioner notification  Workstation performed: [id number]         XR chest portable   Final Result      Persistent severe bilateral pulmonary airspace disease slightly worsened since prior            Workstation performed: [id number]         CTA ED chest PE study   Final Result      No evidence of central pulmonary embolism  Distal segmental branches and subsegmental branches are not well assessed on this study  Extensive consolidative changes in the right greater than left lower lobe, right middle lobe and to a lesser extent in the remainder of the lungs suspicious for multifocal pneumonia  Superimposed interlobular septal thickening could be related to    component of pulmonary edema or interstitial lung disease  Endotracheal tube terminates immediately above the level of the zana  Consider slight retraction of the tube  Bibasilar predominant reticulations and honeycombing consistent with history of pulmonary fibrosis  Workstation performed: [id number]         XR chest 1 view portable   Final Result      Findings suggest asymmetric pulmonary edema and/or pneumonia  Workstation performed: [id number]         XR chest portable    (Results Pending)       Intake and Output  I/O       01/30 0701 - 01/31 0700 01/31 0701 - 02/01 0700    P  O  60     I V  (mL/kg) 528 2 (6 7) 325 3 (4 1)    NG/ 325    IV Piggyback 850 455    Feedings  100    Total Intake(mL/kg) 1888 2 (24) 1205 3 (15 3)    Urine (mL/kg/hr) 1295 (0 7) 1225 (0 6)    Emesis/NG output 5 250    Total Output 1300 1475    Net +588 2 -269 7                Height and Weights      IBW: -88 kg  Body mass index is 31 85 kg/m²  Weight (last 2 days)     Date/Time   Weight    02/01/20 0200   79 (174 16)    01/31/20 0645   78 6 (173 28)                Nutrition       Diet Orders   (From admission, onward)             Start     Ordered    01/30/20 0845  Diet Enteral/Parenteral; Tube Feeding No Oral Diet; Jevity 1 2 Favian; Continuous; 65; 150; Every 4 hours  Diet effective now     Question Answer Comment   Diet Type Enteral/Parenteral    Enteral/Parenteral Tube Feeding No Oral Diet    Tube Feeding Formula: Jevity 1 2 Favian    Bolus/Cyclic/Continuous Continuous    Tube Feeding Goal Rate (mL/hr): 65    Tube Feeding water flush (mL): 150    Water flush frequency: Every 4 hours    RD to adjust diet per protocol?  No        01/30/20 0845 Active Medications  Scheduled Meds:    Current Facility-Administered Medications:  acetaminophen 650 mg Oral Q6H PRN FELICIANO Malave    cefepime 2,000 mg Intravenous Q12H Devaughn Gottron, CRNP Last Rate: 2,000 mg (01/31/20 2041)   chlorhexidine 15 mL Swish & Spit Q12H [address] FELICIANO Lopez    ezetimibe 10 mg Oral Daily FELICIANO Malave    fentaNYL 50 mcg/hr Intravenous Continuous FELICIANO Lim Last Rate: 50 mcg/hr (02/01/20 0401)   hydrALAZINE 5 mg Intravenous Q6H PRN FELICIANO Lopez    hydroxychloroquine 200 mg Oral BID With Meals FELICIANO Malave    insulin lispro 1-5 Units Subcutaneous Q6H [address] FELICIANO Lpoez    levalbuterol 1 25 mg Nebulization Q8H PRN FELICIANO Lopez    levothyroxine 50 mcg Oral Early Morning FELICIANO Palacios    losartan 25 mg Oral Daily FELICIANO Palacios    methylPREDNISolone sodium succinate 1,000 mg Intravenous Daily FELICIANO Gaytan    metroNIDAZOLE 500 mg Intravenous Q8H Devaughn Gottron, CRNP Last Rate: Stopped (02/01/20 0225)   omeprazole (PRILOSEC) suspension 2 mg/mL 20 mg Oral Daily FELICIANO Palacios    pravastatin 80 mg Oral Daily With FELICIANO Starr    propofol 5-50 mcg/kg/min Intravenous Titrated FELICIANO Lopez Last Rate: 32 mcg/kg/min (02/01/20 0329)   vancomycin 10 mg/kg (Adjusted) Intravenous Q12H Trish Thomas MD Last Rate: Stopped (02/01/20 0130)     Continuous Infusions:    fentaNYL 50 mcg/hr Last Rate: 50 mcg/hr (02/01/20 0401)   propofol 5-50 mcg/kg/min Last Rate: 32 mcg/kg/min (02/01/20 0329)     PRN Meds:     acetaminophen 650 mg Q6H PRN   hydrALAZINE 5 mg Q6H PRN   levalbuterol 1 25 mg Q8H PRN     ---------------------------------------------------------------------------------------  Advance Directive and Living Will:      Power of Attorney:    POLST:    ---------------------------------------------------------------------------------------    FELICIANO Nieves      Portions of the record may have been created with voice recognition software  Occasional wrong word or "sound a like" substitutions may have occurred due to the inherent limitations of voice recognition software    Read the chart carefully and recognize, using context, where substitutions have occurred

## 2020-02-01 NOTE — PROGRESS NOTES
Vancomycin IV Pharmacy-to-Dose Consultation    Dustin Sears is a 76 y o  male who is currently receiving Vancomycin IV with management by the Pharmacy Consult service  Assessment/Plan:  The patient was reviewed  Current trough is 15  Renal function is improving with no signs or symptoms of nephrotoxicity and/or infusion reactions were documented in the chart  Based on todays assessment, continue current vancomycin (day # 3) dosing of 750 mg IV every 12 hours, with a plan for trough to be drawn at 1130 on 2/3/20  We will continue to follow the patients culture results and clinical progress daily      Wei Javier, Pharmacist

## 2020-02-01 NOTE — PLAN OF CARE
Problem: PAIN - ADULT  Goal: Verbalizes/displays adequate comfort level or baseline comfort level  Description  Interventions:  - Encourage patient to monitor pain and request assistance  - Assess pain using appropriate pain scale  - Administer analgesics based on type and severity of pain and evaluate response  - Implement non-pharmacological measures as appropriate and evaluate response  - Consider cultural and social influences on pain and pain management  - Notify physician/advanced practitioner if interventions unsuccessful or patient reports new pain  Outcome: Progressing     Problem: INFECTION - ADULT  Goal: Absence or prevention of progression during hospitalization  Description  INTERVENTIONS:  - Assess and monitor for signs and symptoms of infection  - Monitor lab/diagnostic results  - Monitor all insertion sites, i e  indwelling lines, tubes, and drains  - Monitor endotracheal if appropriate and nasal secretions for changes in amount and color  - [place] appropriate cooling/warming therapies per order  - Administer medications as ordered  - Instruct and encourage patient and family to use good hand hygiene technique  - Identify and instruct in appropriate isolation precautions for identified infection/condition  Outcome: Progressing  Goal: Absence of fever/infection during neutropenic period  Description  INTERVENTIONS:  - Monitor WBC    Outcome: Progressing     Problem: SAFETY ADULT  Goal: Patient will remain free of falls  Description  INTERVENTIONS:  - Assess patient frequently for physical needs  -  Identify cognitive and physical deficits and behaviors that affect risk of falls    -  [place] fall precautions as indicated by assessment   - Educate patient/family on patient safety including physical limitations  - Instruct patient to call for assistance with activity based on assessment  - Modify environment to reduce risk of injury  - Consider OT/PT consult to assist with strengthening/mobility  Outcome: Progressing  Goal: Maintain or return to baseline ADL function  Description  INTERVENTIONS:  -  Assess patient's ability to carry out ADLs; assess patient's baseline for ADL function and identify physical deficits which impact ability to perform ADLs (bathing, care of mouth/teeth, toileting, grooming, dressing, etc )  - Assess/evaluate cause of self-care deficits   - Assess range of motion  - Assess patient's mobility; develop plan if impaired  - Assess patient's need for assistive devices and provide as appropriate  - Encourage maximum independence but intervene and supervise when necessary  - Involve family in performance of ADLs  - Assess for home care needs following discharge   - Consider OT consult to assist with ADL evaluation and planning for discharge  - Provide patient education as appropriate  Outcome: Progressing  Goal: Maintain or return mobility status to optimal level  Description  INTERVENTIONS:  - Assess patient's baseline mobility status (ambulation, transfers, stairs, etc )    - Identify cognitive and physical deficits and behaviors that affect mobility  - Identify mobility aids required to assist with transfers and/or ambulation (gait belt, sit-to-stand, lift, walker, cane, etc )  - [place] fall precautions as indicated by assessment  - Record patient progress and toleration of activity level on Mobility SBAR; progress patient to next Phase/Stage  - Instruct patient to call for assistance with activity based on assessment  - Consider rehabilitation consult to assist with strengthening/weightbearing, etc   Outcome: Progressing     Problem: DISCHARGE PLANNING  Goal: Discharge to home or other facility with appropriate resources  Description  INTERVENTIONS:  - Identify barriers to discharge w/patient and caregiver  - Arrange for needed discharge resources and transportation as appropriate  - Identify discharge learning needs (meds, wound care, etc )  - Arrange for interpretive services to assist at discharge as needed  - Refer to Case Management Department for coordinating discharge planning if the patient needs post-hospital services based on physician/advanced practitioner order or complex needs related to functional status, cognitive ability, or social support system  Outcome: Progressing     Problem: Knowledge Deficit  Goal: Patient/family/caregiver demonstrates understanding of disease process, treatment plan, medications, and discharge instructions  Description  Complete learning assessment and assess knowledge base  Interventions:  - Provide teaching at level of understanding  - Provide teaching via preferred learning methods  Outcome: Progressing     Problem: Prexisting or High Potential for Compromised Skin Integrity  Goal: Skin integrity is maintained or improved  Description  INTERVENTIONS:  - Identify patients at risk for skin breakdown  - Assess and monitor skin integrity  - Assess and monitor nutrition and hydration status  - Monitor labs   - Assess for incontinence   - Turn and reposition patient  - Assist with mobility/ambulation  - Relieve pressure over bony prominences  - Avoid friction and shearing  - Provide appropriate hygiene as needed including keeping skin clean and dry  - Evaluate need for skin moisturizer/barrier cream  - Collaborate with interdisciplinary team   - Patient/family teaching  - Consider wound care consult   Outcome: Progressing     Problem: Potential for Falls  Goal: Patient will remain free of falls  Description  INTERVENTIONS:  - Assess patient frequently for physical needs  -  Identify cognitive and physical deficits and behaviors that affect risk of falls    -  [place] fall precautions as indicated by assessment   - Educate patient/family on patient safety including physical limitations  - Instruct patient to call for assistance with activity based on assessment  - Modify environment to reduce risk of injury  - Consider OT/PT consult to assist with strengthening/mobility  Outcome: Progressing     Problem: RESPIRATORY - ADULT  Goal: Achieves optimal ventilation and oxygenation  Description  INTERVENTIONS:  - Assess for changes in respiratory status  - Assess for changes in mentation and behavior  - Position to facilitate oxygenation and minimize respiratory effort  - Oxygen administered by appropriate delivery if ordered  - Initiate smoking cessation education as indicated  - Encourage broncho-pulmonary hygiene including cough, deep breathe, Incentive Spirometry  - Assess the need for suctioning and aspirate as needed  - Assess and instruct to report SOB or any respiratory difficulty  - Respiratory Therapy support as indicated  Outcome: Progressing     Problem: Nutrition/Hydration-ADULT  Goal: Nutrient/Hydration intake appropriate for improving, restoring or maintaining nutritional needs  Description  Monitor and assess patient's nutrition/hydration status for malnutrition  Collaborate with interdisciplinary team and initiate plan and interventions as ordered  Monitor patient's weight and dietary intake as ordered or per policy  Utilize nutrition screening tool and intervene as necessary  Determine patient's food preferences and provide high-protein, high-caloric foods as appropriate       INTERVENTIONS:  - Monitor oral intake, urinary output, labs, and treatment plans  - Assess nutrition and hydration status and recommend course of action  - Evaluate amount of meals eaten  - Assist patient with eating if necessary   - Allow adequate time for meals  - Recommend/ encourage appropriate diets, oral nutritional supplements, and vitamin/mineral supplements  - Order, calculate, and assess calorie counts as needed  - Recommend, monitor, and adjust tube feedings and TPN/PPN based on assessed needs  - Assess need for intravenous fluids  - Provide specific nutrition/hydration education as appropriate  - Include patient/family/caregiver in decisions related to nutrition  Outcome: Progressing     Problem: DECISION MAKING  Goal: Pt/Family able to effectively weigh alternatives and participate in decision making related to treatment and care  Description  INTERVENTIONS:  - Identify decision maker  - Determine when there are differences among patient's view, family's view, and healthcare provider's view of patient condition and care goals  - Facilitate patient/family articulation of goals for care  - Help patient/family identify pros/cons of alternative solutions  - Provide information as requested by patient/family  - Respect patient/family rights related to privacy and sharing information   - Serve as a liaison between patient, family and health care team  - Initiate consults as appropriate (Ethics Team, Palliative Care, Family Care Conference, etc )  Outcome: Progressing     Problem: CONFUSION/THOUGHT DISTURBANCE  Goal: Thought disturbances (confusion, delirium, depression, dementia or psychosis) are managed to maintain or return to baseline mental status and functional level  Description  INTERVENTIONS:  - Assess for possible contributors to  thought disturbance, including but not limited to medications, infection, impaired vision or hearing, underlying metabolic abnormalities, dehydration, respiratory compromise,  psychiatric diagnoses and notify attending PHYSICAN/AP  - Monitor and intervene to maintain adequate nutrition, hydration, elimination, sleep and activity  - Decrease environmental stimuli, including noise as appropriate  - Provide frequent contacts to provide refocusing, direction and reassurance as needed  Approach patient calmly with eye contact and at their level    - [place] high risk fall precautions, aspiration precautions and other safety measures, as indicated  - If delirium suspected, notify physician/AP of change in condition and request immediate in-person evaluation  - Pursue consults as appropriate including Geriatric (campus dependent), OT for cognitive evaluation/activity planning, psychiatric, pastoral care, etc   Outcome: Progressing     Problem: BEHAVIOR  Goal: Pt/Family maintain appropriate behavior and adhere to behavioral management agreement, if implemented  Description  INTERVENTIONS:  - Assess the family dynamic   - Encourage verbalization of thoughts and concerns in a socially appropriate manner  - Assess patient/family's coping skills and non-compliant behavior (including use of illegal substances)  - Utilize positive, consistent limit setting strategies supporting safety of patient, staff and others  - Initiate consult with Case Management, Spiritual Care or other ancillary services as appropriate  - If a patient's/visitor's behavior jeopardizes the safety of the patient, staff, or others, refer to organization procedure     - Notify Security of behavior or suspected illegal substances which indicate the need for search of the patient and/or belongings  - Encourage participation in the decision making process about a behavioral management agreement; implement if patient meets criteria  Outcome: Progressing     Problem: SAFETY,RESTRAINT: NV/NON-SELF DESTRUCTIVE BEHAVIOR  Goal: Remains free of harm/injury (restraint for non violent/non self-detsructive behavior)  Description  INTERVENTIONS:  - Instruct patient/family regarding restraint use   - Assess and monitor physiologic and psychological status   - Provide interventions and comfort measures to meet assessed patient needs   - Identify and implement measures to help patient regain control  - Assess readiness for release of restraint   Outcome: Progressing  Goal: Returns to optimal restraint-free functioning  Description  INTERVENTIONS:  - Assess the patient's behavior and symptoms that indicate continued need for restraint  - Identify and implement measures to help patient regain control  - Assess readiness for release of restraint   Outcome: Progressing

## 2020-02-01 NOTE — ASSESSMENT & PLAN NOTE
· Likely due to demand ischemia  · Troponin spike>3     · Echo 1/30 -- EF 59%, grade 1 diastolic dysfunction, severe pulmonary HTN

## 2020-02-02 NOTE — ASSESSMENT & PLAN NOTE
No results found for: HGBA1C    Recent Labs     01/31/20  1746 02/01/20  0004 02/01/20  1202 02/01/20  1758   POCGLU 290* 362* 300* 416*       Blood Sugar Average: Last 72 hrs:  (P) 375 [id number]  · 11/2019 Hb A1C-7 6  · Accuchecks with SSI coverage Q6 hours while NPO  · Hold outpatient meds    · Average glucose has been mildly higher in the setting of pulse-dose steroids

## 2020-02-02 NOTE — ASSESSMENT & PLAN NOTE
Wt Readings from Last 3 Encounters:   02/01/20 79 kg (174 lb 2 6 oz)   09/15/16 83 2 kg (183 lb 8 oz)   03/17/16 82 7 kg (182 lb 6 1 oz)   patient has +3L balance since admission, given lasix for hypoxia with significant diuresis    · 6/2019 Echo with EF-65%  · Echo 1/30 EF 65%  · Monitor fluid status    · Record I/Os

## 2020-02-02 NOTE — PROGRESS NOTES
Vancomycin IV Pharmacy-to-Dose Consultation    Anisa Coronado is a 76 y o  male who is currently receiving Vancomycin IV with management by the Pharmacy Consult service  Assessment/Plan:  The patient was reviewed  Renal function is stable and no signs or symptoms of nephrotoxicity and/or infusion reactions were documented in the chart  Based on todays assessment, continue current vancomycin (day # 4) dosing of 750 mg IV every 12 hours, with a plan for trough to be drawn at 1130 on 2/3/2020  We will continue to follow the patients culture results and clinical progress daily      Claudene Epp, Pharmacist

## 2020-02-02 NOTE — NURSING NOTE
1500 Pt hypotensive with systolic BP in the 01'I  Prior VSS  BP rechecked and pt  More hypotensive with SBP in the 60's  CRNP LEXI DEXTER aware  Bp cuff changed and BP rechecked, again hypotensive with SBP's in the 50's  1L LR bolus up wide open  LEvophed gtt  Started at 30 mcg/min  Christine Jett and Dr Jyotsna Shepherd at bedside Strong carotid pulse palpable at this time, but unable to obtain BP  Pt  Taken of vent and bagged at 100% FIO2 by RT     1532 Pt more bradycardic in the 30's  Unable to palpate a carotid pulse  Code Blue called  Refer to Code narrator for code documentation

## 2020-02-02 NOTE — ASSESSMENT & PLAN NOTE
· Likely due to demand ischemia  · Troponin spike>3     · Echo 1/30 -- EF 27%, grade 1 diastolic dysfunction, severe pulmonary HTN

## 2020-02-02 NOTE — ASSESSMENT & PLAN NOTE
· Likely secondary to multilobar pneumonia  · Patient has a history of MSSA bacteremia in 2019 with unknown etiology  · With initial lactic acidosis of 7, reduced to 2 following treatment  · BC x2 neg for 72H, flu negative   · Sputum culture in process  · Continue ceftriaxone, vanc, flagyl   · CT scan of chest with significant disease, R>L  · Bronchial washings pending   · Temp 100 last evening, plan for reculture if temp reaches 100 8

## 2020-02-02 NOTE — DEATH NOTE
Death Note  David Berman 76 y o  male MRN: [id number]  Unit/Bed#: ICU 06 Encounter: [id number]      Situation:  Patient had sudden decompensation with bradycardia and hypotension  ICU staff at bedside at the time of decompensation  Attending physician made ventilator setting changes to [facility]  Tidal volumes were appropriate  Patient was hypoxic and bag-valve ventilation was performed to improve oxygenation  Patient rain dated bradycardic despite bag-valve ventilation and then lost pulses  ACLS protocols were initiated  Patient had Rosc and then after 30 minutes +of ACLS protocol resuscitation with periods of Rosc the patient eventually had loss of all pulses and we were unable to 8 obtain Rosc despite ACLS protocols Please refer to code sheet documentation as to the events of code  Background:  David Berman is a 76y o  year old male who presented to the hospital with acute hypoxic respiratory failure after being found by EMS with agonal respirations at his home  He was originally placed on CPAP EN route to the hospital   Upon arrival to the emergency department he was found to be tachypneic with labored breathing and was urgently intubated  The patient is known to have a history of interstitial lung disease and uses home oxygen delivery  He is known to have a history of DVT and is on Coumadin  He is also known to have anti phospholipid antibody positive history with known chronic kidney disease stage 2, type 2 diabetes, heart failure with an EF of 65%, and a history of thrombocytopenia  He was maintained on the ventilator secondary to interstitial lung disease and had a bronchoscopy secondary to concern for alveolar hemorrhage  He also had a drop in his hemoglobin on 01/29/2020 to 6 8  He was transfused 1 unit of packed red blood cells and post transfusion his hemoglobin remained stable at approximately 7 4    Additionally was extubated on 01/29 2020 and unfortunately had to be reintubated on 01/30/2020 secondary to acute hypoxic respiratory failure  Additionally remain on broad-spectrum antibiotics and was started on pulse dose steroids secondary to presumed alveolar hemorrhage  On 02/01/2020 family discussions as to goals of care for completed  The patient was maintained on the ventilator with hopes of possible extubation and discussions were around goals of care and if he would want to be reintubated  He had a sudden decompensation at with bradycardia and hypotension as outlined above on 02/02/2020 at approximately 15 30 with staff in attendance  Please refer to code documentation for events of code  Historical Information   Past Medical History:   Diagnosis Date    [facility]     Diabetes mellitus ([facility] )     Disease of thyroid gland     Hypertension     Interstitial lung disease ([facility] )     Renal disorder      History reviewed  No pertinent surgical history  Assessment:  No spontaneous movements were present  There was not response to verbal or tactile stimuli  Pupils were dilated and fixed  No breath sounds were appreciated over either lung field  No carotid pulses were palpable  No heart sounds were auscultator over entire precordium        Meds/Allergies     Current Facility-Administered Medications:     acetaminophen (TYLENOL) tablet 650 mg, 650 mg, Oral, Q6H PRN, 650 mg at 01/31/20 0357    cefepime (MAXIPIME) 2,000 mg in dextrose 5 % 50 mL IVPB, 2,000 mg, Intravenous, Q12H, Stopped at 02/02/20 0950    chlorhexidine (PERIDEX) 0 12 % oral rinse 15 mL, 15 mL, Swish & Spit, Q12H [facility], 15 mL at 02/02/20 0858    EPINEPHrine 3000 mcg (STANDARD CONCENTRATION) IV in sodium chloride 0 9% 250 mL, 1-10 mcg/min, Intravenous, Once    ezetimibe (ZETIA) tablet 10 mg, 10 mg, Oral, Daily, 10 mg at 02/02/20 0919    fentaNYL 1000 mcg in sodium chloride 0 9% 100mL infusion, 50 mcg/hr, Intravenous, Continuous, 50 mcg/hr at 02/02/20 0908    heparin (porcine) subcutaneous injection 5,000 Units, 5,000 Units, Subcutaneous, Q8H [address], 5,000 Units at 02/02/20 1420    hydrALAZINE (APRESOLINE) injection 5 mg, 5 mg, Intravenous, Q6H PRN, 5 mg at 01/29/20 2202    hydroxychloroquine (PLAQUENIL) tablet 200 mg, 200 mg, Oral, BID With Meals, 200 mg at 02/02/20 0718    insulin regular (HumuLIN R,NovoLIN R) 1 Units/mL in sodium chloride 0 9 % 100 mL infusion, 0 3-21 Units/hr, Intravenous, Titrated, 9 Units/hr at 02/02/20 1351    levalbuterol (XOPENEX) inhalation solution 1 25 mg, 1 25 mg, Nebulization, Q8H PRN    levothyroxine tablet 50 mcg, 50 mcg, Oral, Early Morning, 50 mcg at 02/02/20 0500    losartan (COZAAR) tablet 25 mg, 25 mg, Oral, Daily, 25 mg at 02/02/20 0858    metroNIDAZOLE (FLAGYL) IVPB (premix) 500 mg, 500 mg, Intravenous, Q8H, 500 mg at 02/02/20 1003    norepinephrine (LEVOPHED) 1 mg/mL injection **ADS Override Pull**, , ,     omeprazole (PRILOSEC) suspension 2 mg/mL, 20 mg, Oral, Daily, 20 mg at 02/02/20 0919    pravastatin (PRAVACHOL) tablet 80 mg, 80 mg, Oral, Daily With Dinner, 80 mg at 02/01/20 1611    propofol (DIPRIVAN) 1000 mg in 100 mL infusion (premix), 5-50 mcg/kg/min, Intravenous, Titrated, 25 mcg/kg/min at 02/02/20 0858    vancomycin 750 mg in sodium chloride 0 9% 250 mL, 10 mg/kg (Adjusted), Intravenous, Q12H, 750 mg at 02/02/20 1151  No Known Allergies    Recommendation:  Patient pronounced dead at 33 64 74  Family and Attending physician were notified  [place] and postmortem services offered to the family  The family accepts/declines organ donation  Patients major medical illness was interstitial lung disease with alveolar hemorrhage  Time of death was 33 64 74   , confirmed and witnessed by Nurse [facility]  Code Status: Level 1 - Full Code at time of death

## 2020-02-02 NOTE — PROGRESS NOTES
Progress Note - Arline Johnston 1945, 76 y o  male MRN: [id number]    Unit/Bed#: ICU 06 Encounter: [id number]    Primary Care Provider: Kurt Moeller MD   Date and time admitted to hospital: 1/28/2020 11:02 PM        Acute and chronic respiratory failure with hypoxia [facility])  Assessment & Plan  · Re-intubated 1/29 for severe hypoxia and respiratory distress  · B/L airspace opacities in the setting of ILD noted on CT scan of chest  · PCXR following intubation appears worse on right, there also may be a component of fluid overload; lasix 40 mg IV given  · Continue ceftriaxone/vanc/flagyl, antibiotic day 5  · Aggressive pulmonary toileting  · VAP bundle  · Duonebs Q6 hours ATC and xopenex prn  · Was bronched 1/30 -- concern for PNA versus ILD exacerbation versus alveolar hemorrhage   · Changed to PS 1/31 with goal -500     * Septic shock ([address] )  Assessment & Plan  · Likely secondary to multilobar pneumonia  · Patient has a history of MSSA bacteremia in 2019 with unknown etiology  · With initial lactic acidosis of 7, reduced to 2 following treatment  · BC x2 neg for 72H, flu negative   · Sputum culture in process  · Continue ceftriaxone, vanc, flagyl   · CT scan of chest with significant disease, R>L  · Bronchial washings pending   · Temp 100 last evening, plan for reculture if temp reaches 100 8        Elevated troponin level not due myocardial infarction  Assessment & Plan  · Likely due to demand ischemia  · Troponin spike>3     · Echo 1/30 -- EF 01%, grade 1 diastolic dysfunction, severe pulmonary HTN      Interstitial lung disease (HCC)  Assessment & Plan  · On 4LNC home oxygen around the clock  · On cellcept and prednisone as outpatient  · Follows with [facility] pulmonary, last seen 10/2019  · Started on solumedrol 1g IV 1/30 -- continue 3-5 days, then wean      Community acquired pneumonia  Assessment & Plan  · Continue antibiotics as above  · Strep, legionella negative  · Rapid flu/ RSV negative Thrombocythemia ([facility] [address] )  Assessment & Plan  · On plaquenil and hydrea as an outpatient  · Follows with hematology      History of DVT (deep vein thrombosis)  Assessment & Plan  · 1/72020 INR-2 0  · INR on admission 2 89  · Coumadin on hold given elevated INR      CHF (congestive heart failure) ([facility] )  Assessment & Plan  Wt Readings from Last 3 Encounters:   02/01/20 79 kg (174 lb 2 6 oz)   09/15/16 83 2 kg (183 lb 8 oz)   03/17/16 82 7 kg (182 lb 6 1 oz)   patient has +3L balance since admission, given lasix for hypoxia with significant diuresis    · 6/2019 Echo with EF-65%  · Echo 1/30 EF 65%  · Monitor fluid status    · Record I/Os    Essential hypertension  Assessment & Plan  · Hold losartan  · Will monitor hemodynamics closely      Diabetes mellitus type 2 in nonobese [facility])  Assessment & Plan  No results found for: HGBA1C    Recent Labs     01/31/20  1746 02/01/20  0004 02/01/20  1202 02/01/20  1758   POCGLU 290* 362* 300* 416*       Blood Sugar Average: Last 72 hrs:  (P) 804 [id number]  · 11/2019 Hb A1C-7 6  · Accuchecks with SSI coverage Q6 hours while NPO  · Hold outpatient meds    · Average glucose has been mildly higher in the setting of pulse-dose steroids     CKD (chronic kidney disease), stage II  Assessment & Plan  · Monitor renal indices, urine output  · Avoid nephrotoxins        ----------------------------------------------------------------------------------------  HPI/24hr events: Discussions with family are ongoing regarding goals of care  He continues to prove difficult to wean given continue O2 requirements      Disposition: Continue Critical Care   Code Status: Level 1 - Full Code  ---------------------------------------------------------------------------------------  SUBJECTIVE  Intubated and sedated     Review of Systems  Review of systems was unable to be performed secondary to intubated and sedated ---------------------------------------------------------------------------------------  OBJECTIVE    Vitals   Vitals:    20 0100 20 0200 20 0300 20 0453   BP: 148/69 137/65 135/62    Pulse: 100 100 98    Resp:       Temp: 99 7 °F (37 6 °C) 99 7 °F (37 6 °C) 99 7 °F (37 6 °C)    TempSrc:       SpO2: 94% 96% 96%    Weight:    78 2 kg (172 lb 6 4 oz)     Temp (24hrs), Av 1 °F (37 3 °C), Min:97 5 °F (36 4 °C), Max:99 7 °F (37 6 °C)  Current: Temperature: 99 7 °F (37 6 °C)        SpO2: SpO2: 96 %, SpO2 Activity: SpO2 Activity: At Rest, SpO2 Device: O2 Device: (Vent)       Physical Exam   Constitutional: He appears well-developed and well-nourished  He is easily aroused  Non-toxic appearance  No distress  He is sedated, intubated and restrained  HENT:   Head: Normocephalic and atraumatic  Mouth/Throat: Mucous membranes are normal  No oropharyngeal exudate  Eyes: EOM and lids are normal  Right eye exhibits no discharge  Left eye exhibits no discharge  No scleral icterus  Pupils -- L 4mm sluggish, R 2mm sluggish    Neck: Normal range of motion  Neck supple  No tracheal deviation present  Cardiovascular: Normal rate, regular rhythm, normal heart sounds and intact distal pulses  Exam reveals no gallop and no friction rub  No murmur heard  Pulses:       Radial pulses are 2+ on the right side, and 2+ on the left side  Dorsalis pedis pulses are 2+ on the right side, and 2+ on the left side  Pulmonary/Chest: He is intubated  He has decreased breath sounds (throughout)  He has rhonchi (throughout)  Abdominal: Soft  Normal appearance and bowel sounds are normal  He exhibits no distension  There is no tenderness  Genitourinary:   Genitourinary Comments: Liyah present    Lymphadenopathy:     He has no cervical adenopathy  Neurological: He is easily aroused  Sedated    Skin: Skin is warm and dry  Capillary refill takes less than 2 seconds  No rash noted   He is not diaphoretic  No erythema  No pallor  Nursing note and vitals reviewed        Invasive/non-invasive ventilation settings   Respiratory    Lab Data (Last 4 hours)    None         O2/Vent Data (Last 4 hours)      02/02 0150           Vent Mode AC/PC       Resp Rate (BPM) (BPM) 14       Pressure Control (cmH2O) (cm) 5       Insp Time (sec) (sec) 1       FiO2 (%) (%) 55       PEEP (cmH2O) (cmH2O) 10       MV 8 85                   Laboratory and Diagnostics:  Results from last 7 days   Lab Units 02/01/20  0452 01/31/20  0458 01/30/20  0748 01/29/20  1232 01/29/20  0638 01/28/20  2336   WBC Thousand/uL 1 07* 1 79* 9 50  --  9 58 9 69   HEMOGLOBIN g/dL 7 2*  7 2* 8 1* 8 6* 6 8* 6 8* 8 8*   HEMATOCRIT % 22 6* 25 0* 26 3*  --  21 3* 27 3*   PLATELETS Thousands/uL 282 329 344  --  319 496*   NEUTROS PCT %  --   --   --   --   --  79*   BANDS PCT %  --   --   --   --  6  --    MONOS PCT %  --   --   --   --   --  6   MONO PCT %  --   --   --   --  9  --      Results from last 7 days   Lab Units 02/01/20  0413 01/31/20  0458 01/30/20  0748 01/29/20  0638 01/29/20  0008   SODIUM mmol/L 141 138 140 141 139   POTASSIUM mmol/L 3 9 3 8 3 9 3 8 3 3*   CHLORIDE mmol/L 109* 105 104 109* 104   CO2 mmol/L 26 24 25 23 21   ANION GAP mmol/L 6 9 11 9 14*   BUN mg/dL 25 22 17 18 16   CREATININE mg/dL 1 18 1 47* 1 32* 1 16 1 23   CALCIUM mg/dL 7 7* 7 6* 7 9* 7 1* 8 1*   GLUCOSE RANDOM mg/dL 224* 294* 129 97 124   ALT U/L  --   --   --   --  18   AST U/L  --   --   --   --  24   ALK PHOS U/L  --   --   --   --  74   ALBUMIN g/dL  --   --   --   --  3 1*   TOTAL BILIRUBIN mg/dL  --   --   --   --  0 29     Results from last 7 days   Lab Units 01/30/20  0748 01/29/20  0350   MAGNESIUM mg/dL 2 2 1 5*   PHOSPHORUS mg/dL  --  2 3      Results from last 7 days   Lab Units 01/31/20  0458 01/30/20  0748 01/29/20  0008   INR  2 13* 2 96* 2 89*   PTT seconds  --   --  60*      Results from last 7 days   Lab Units 01/29/20  0927 01/29/20  0620 01/29/20  0314 01/29/20  0008   TROPONIN I ng/mL 3 36* 2 34* 2 50* 1 28*     Results from last 7 days   Lab Units 01/29/20  0135 01/28/20  2334   LACTIC ACID mmol/L 2 2* 7 3*     ABG:  Results from last 7 days   Lab Units 01/30/20  0123   PH ART  7 362   PCO2 ART mm Hg 36 5   PO2 ART mm Hg 118 6   HCO3 ART mmol/L 20 3*   BASE EXC ART mmol/L -4 5   ABG SOURCE  Radial, Right     VBG:  Results from last 7 days   Lab Units 01/30/20  0123   ABG SOURCE  Radial, Right     Results from last 7 days   Lab Units 02/01/20  0412 01/29/20  0045   PROCALCITONIN ng/ml 3 62* 1 09*       Micro  Results from last 7 days   Lab Units 01/30/20  1725 01/29/20  0317 01/28/20  2336   BLOOD CULTURE   --   --  No Growth at 72 hrs  No Growth at 72 hrs  SPUTUM CULTURE   --  Few Colonies of   --    GRAM STAIN RESULT  No Polys or Bacteria seen 1+ Polys  No organisms seen  --    LEGIONELLA URINARY ANTIGEN   --  Negative  --    STREP PNEUMONIAE ANTIGEN, URINE   --  Negative  --        EKG: NSR on tele   Imaging: no new imaging    XR chest portable   Final Result      Bilateral diffuse pulmonary infiltrates, mild improvement compared to prior  Suggest retracting PICC line as above  Workstation performed: [id number]         XR chest PICC line portable   Final Result      Right PICC line tip difficult to identify but appears to be at cavoatrial level  ET tube tip 1 8 cm above zana  NG tube tip coiled upon itself and at the GE junction  Worsening bilateral airspace disease  The study was marked in EPIC for significant notification  Workstation performed: [id number]         XR chest portable ICU   Final Result      Endotracheal tube tip at zana  Recommend retraction approximately 3 to 4 cm  The examination demonstrates a significant  finding and was documented as such in [facility] for liaison and referring practitioner notification              Workstation performed: [id number]         XR chest portable   Final Result      Persistent severe bilateral pulmonary airspace disease slightly worsened since prior            Workstation performed: [id number]         CTA ED chest PE study   Final Result      No evidence of central pulmonary embolism  Distal segmental branches and subsegmental branches are not well assessed on this study  Extensive consolidative changes in the right greater than left lower lobe, right middle lobe and to a lesser extent in the remainder of the lungs suspicious for multifocal pneumonia  Superimposed interlobular septal thickening could be related to    component of pulmonary edema or interstitial lung disease  Endotracheal tube terminates immediately above the level of the zana  Consider slight retraction of the tube  Bibasilar predominant reticulations and honeycombing consistent with history of pulmonary fibrosis  Workstation performed: [id number]         XR chest 1 view portable   Final Result      Findings suggest asymmetric pulmonary edema and/or pneumonia  Workstation performed: [id number]               Intake and Output  I/O       01/31 0701 - 02/01 0700 02/01 0701 - 02/02 0700    I V  (mL/kg) 405 3 (5 1) 317 1 (4)    NG/ 345    IV Piggyback 455     Feedings 100 870    Total Intake(mL/kg) 1485 3 (18 8) 1532 1 (19 4)    Urine (mL/kg/hr) 1360 (0 7) 770 (0 4)    Emesis/NG output 250     Total Output 1610 770    Net -124 7 +762 1                Height and Weights      IBW: -88 kg  Body mass index is 31 53 kg/m²    Weight (last 2 days)     Date/Time   Weight    02/02/20 0453   78 2 (172 4)    02/01/20 0200   79 (174 16)    01/31/20 0645   78 6 (173 28)                Nutrition       Diet Orders   (From admission, onward)             Start     Ordered    01/30/20 0845  Diet Enteral/Parenteral; Tube Feeding No Oral Diet; Jevity 1 2 Favian; Continuous; 65; 150; Every 4 hours  Diet effective now     Question Answer Comment   Diet Type Enteral/Parenteral    Enteral/Parenteral Tube Feeding No Oral Diet    Tube Feeding Formula: Jevity 1 2 Favian    Bolus/Cyclic/Continuous Continuous    Tube Feeding Goal Rate (mL/hr): 65    Tube Feeding water flush (mL): 150    Water flush frequency: Every 4 hours    RD to adjust diet per protocol?  No        01/30/20 0845                  Active Medications  Scheduled Meds:    Current Facility-Administered Medications:  acetaminophen 650 mg Oral Q6H PRN FELICIANO Martinez    cefepime 2,000 mg Intravenous Q12H FELICIANO Mcfarlane Last Rate: 2,000 mg (02/01/20 2041)   chlorhexidine 15 mL Swish & Spit Q12H [address] Melina Franc, CRNP    ezetimibe 10 mg Oral Daily FELICIANO Martinez    fentaNYL 50 mcg/hr Intravenous Continuous FELICIANO Herrera Last Rate: 50 mcg/hr (02/01/20 1428)   hydrALAZINE 5 mg Intravenous Q6H PRN FELICIANO Odonnell    hydroxychloroquine 200 mg Oral BID With Meals FELICIANO Martinez    insulin lispro 1-5 Units Subcutaneous Q6H [address] Terrial Franc, FELICIANO    levalbuterol 1 25 mg Nebulization Q8H PRN FELICIANO Odonnell    levothyroxine 50 mcg Oral Early Morning Brunilda K FELICIANO Connell    losartan 25 mg Oral Daily Brunilda K FELICIANO Connell    metroNIDAZOLE 500 mg Intravenous Q8H Brunilda FELICIANO Espinoza Last Rate: 500 mg (02/02/20 0045)   omeprazole (PRILOSEC) suspension 2 mg/mL 20 mg Oral Daily Brunilda K FELICIANO Connell    pravastatin 80 mg Oral Daily With [facility] K FELICIANO Connell    propofol 5-50 mcg/kg/min Intravenous Titrated FELICIANO Odonnell Last Rate: 25 mcg/kg/min (02/02/20 0045)   vancomycin 10 mg/kg (Adjusted) Intravenous Q12H Paulina Roberts MD Last Rate: 0 mg (02/01/20 1430)     Continuous Infusions:    fentaNYL 50 mcg/hr Last Rate: 50 mcg/hr (02/01/20 1428)   propofol 5-50 mcg/kg/min Last Rate: 25 mcg/kg/min (02/02/20 0045)     PRN Meds:     acetaminophen 650 mg Q6H PRN   hydrALAZINE 5 mg Q6H PRN   levalbuterol 1 25 mg Q8H PRN ---------------------------------------------------------------------------------------  Advance Directive and Living Will:      Power of :    POLST:    ---------------------------------------------------------------------------------------    FELICIANO Rothman      Portions of the record may have been created with voice recognition software  Occasional wrong word or "sound a like" substitutions may have occurred due to the inherent limitations of voice recognition software    Read the chart carefully and recognize, using context, where substitutions have occurred

## 2020-02-02 NOTE — DISCHARGE SUMMARY
Discharge Summary   Betty Guerrero 76 y o  male MRN: [id number]  Unit/Bed#: ICU 06 Encounter: [id number]    Admission Date: 1/28/2020     Discharge Date: 2/2/2020    Admitting Diagnoses:   1  Acute on chronic hypoxic respiratory failure  2  Community-acquired pneumonia  3  Anemia  4  History of DVT  5  Interstitial lung disease  6  Thrombocytopenia    Discharge Diagnoses:  1  Cardiopulmonary arrest in the setting of interstitial lung disease with diffuse alveolar hemorrhage with capillaritis  2  Acute on chronic hypoxic respiratory failure  3  Community-acquired pneumonia  4  Thrombocytopenia  5  History of DVT   6  Right upper extremity edema with malpositioned PICC line  7  Leukopenia      Procedures Performed:   Orders Placed This Encounter   Procedures   283 [facility] ED ECG Documentation Only    Intubation   [employer] Intubation       Hospital Course: This is 57-year-old male who presented to the hospital with acute hypoxic respiratory failure after being found by EMS with agonal respirations at his home  He was originally placed on CPAP in route to the hospital   Upon arrival to the emergency department he was found to be tachypneic with labored breathing and was urgently intubated  The patient has a known history of interstitial lung disease and uses home oxygen delivery  He is known to have history of DVT and is on Coumadin  He is also known to have antiphospholipid antibody positive history with known chronic kidney disease stage 2, type 2 diabetes mellitus, heart failure with EF of 65%, and a history of thrombocytopenia  He was admitted to the ICU and maintained on ventilator due to interstitial lung disease and presumed community-acquired pneumonia  He was extubated on 01/29/2020 and unfortunately had to be reintubated on 01/30/2020 secondary to acute hypoxic respiratory failure    He had a drop in his hemoglobin on 01/29/2000 20-6 8 he was transfused 1 unit of packed red blood cells and post transfusion his hemoglobin remained stable at approximately 7 4  He remained on broad-spectrum antibiotics and was started on pulse dose steroids secondary to presumed alveolar hemorrhage after bronchoscopy on 2020  On 2020 family discussions as to the goals of care were completed  The patient was maintained on the ventilator with hopes of possible extubation and discussions around goals of care were if he would want to be reintubated  He had a sudden decompensation with bradycardia and hypotension as outlined above  This sudden decompensation happened on 2020 at 15:30 with the staff in attendance  Please refer to code documentation for events of cardiac arrest   The patient subsequently did not have return of spontaneous circulation and he was pronounced dead at 16 15 on 2020  Significant Findings, Care, Treatment and Services Provided:   Acute alveolar hemorrhage capillaritis    Complications:   Cardiac arrest    Condition at Discharge:      Discharge instructions/Information to patient and family:   See after visit summary for information provided to patient and family  Provisions for Follow-Up Care:  See after visit summary for information related to follow-up care and any pertinent home health orders  Disposition:  Barbara    Discharge Statement   I spent 30 minutes discharging the patient  This time was spent on the day of discharge  I had direct contact with the patient on the day of discharge  Additional documentation is required if more than 30 minutes were spent on discharge  Discharge Medications:  See after visit summary for reconciled discharge medications provided to patient and family  All medications were discontinued

## 2020-02-02 NOTE — PROGRESS NOTES
Patient had sudden decompensation with bradycardia and hypotension  I was at the bedside and changed ventilator settings to A/C V/C  Tidal volumes were appropriate  Pt was hypoxic but there was not a good waveform  We performed bag-valve ventilation to help improve oxygenation  Patient remained bradycardic and then lost his pulses  ACLS and BLS protocols were initiated  Please see code sheet for details  I spoke to the patient's children and wife multiple times throughout the code  After 30+ minutes, we were unable to obtain ROSC  The family came to the bedside and the patient

## 2020-02-03 VITALS
TEMPERATURE: 100.8 F | WEIGHT: 172.4 LBS | BODY MASS INDEX: 31.53 KG/M2 | SYSTOLIC BLOOD PRESSURE: 65 MMHG | DIASTOLIC BLOOD PRESSURE: 57 MMHG | OXYGEN SATURATION: 72 %

## 2020-02-03 LAB
BACTERIA BLD CULT: NORMAL
BACTERIA BLD CULT: NORMAL
GLUCOSE SERPL-MCNC: 236 MG/DL (ref 65–140)
GLUCOSE SERPL-MCNC: 243 MG/DL (ref 65–140)

## 2020-02-03 RX ORDER — CALCIUM CHLORIDE 100 MG/ML
SYRINGE (ML) INTRAVENOUS CODE/TRAUMA/SEDATION MEDICATION
Status: COMPLETED | OUTPATIENT
Start: 2020-01-01 | End: 2020-01-01

## 2020-02-03 RX ADMIN — EPINEPHRINE 0.5 MG: 0.1 INJECTION, SOLUTION ENDOTRACHEAL; INTRACARDIAC; INTRAVENOUS at 13:47

## 2020-02-03 RX ADMIN — EPINEPHRINE 10 MCG/MIN: 1 INJECTION, SOLUTION, CONCENTRATE INTRAVENOUS at 13:51

## 2020-02-03 NOTE — CLINICAL RISK MANAGEMENT
Progress Note - Death in Restraints   Muriel Darden 76 y o  male MRN: [id number]  Unit/Bed#: ICU 06 Encounter: [id number]      Patient  while restrained  with Soft restraint right wrist and Soft restraint left wrist  Death unrelated to use of restraints  This situation was tracked internally  CMS and PA-YORDY  notification not required

## 2020-02-06 LAB — MISCELLANEOUS LAB TEST RESULT: NORMAL

## 2020-03-02 LAB — FUNGUS SPEC CULT: NORMAL

## 2020-03-17 LAB
MYCOBACTERIUM SPEC CULT: NORMAL
RHODAMINE-AURAMINE STN SPEC: NORMAL